# Patient Record
Sex: FEMALE | Race: WHITE | Employment: OTHER | URBAN - METROPOLITAN AREA
[De-identification: names, ages, dates, MRNs, and addresses within clinical notes are randomized per-mention and may not be internally consistent; named-entity substitution may affect disease eponyms.]

---

## 2017-02-24 ENCOUNTER — PATIENT MESSAGE (OUTPATIENT)
Dept: FAMILY MEDICINE CLINIC | Facility: CLINIC | Age: 67
End: 2017-02-24

## 2017-02-24 DIAGNOSIS — N63.0 BREAST NODULE: Primary | ICD-10-CM

## 2017-02-25 ENCOUNTER — APPOINTMENT (OUTPATIENT)
Dept: LAB | Facility: HOSPITAL | Age: 67
End: 2017-02-25
Attending: FAMILY MEDICINE
Payer: COMMERCIAL

## 2017-02-25 DIAGNOSIS — E89.0 POSTOPERATIVE HYPOTHYROIDISM: ICD-10-CM

## 2017-02-25 LAB
FREE T4: 1.3 NG/DL (ref 0.9–1.8)
TSI SER-ACNC: 0.31 MIU/ML (ref 0.35–5.5)

## 2017-02-25 PROCEDURE — 84443 ASSAY THYROID STIM HORMONE: CPT

## 2017-02-25 PROCEDURE — 84439 ASSAY OF FREE THYROXINE: CPT

## 2017-02-25 PROCEDURE — 36415 COLL VENOUS BLD VENIPUNCTURE: CPT

## 2017-02-27 DIAGNOSIS — E89.0 HYPOTHYROIDISM ASSOCIATED WITH SURGICAL PROCEDURE: Primary | ICD-10-CM

## 2017-02-27 RX ORDER — LEVOTHYROXINE SODIUM 88 UG/1
88 TABLET ORAL
Qty: 30 TABLET | Refills: 1 | Status: SHIPPED | OUTPATIENT
Start: 2017-02-27 | End: 2017-04-10

## 2017-02-27 NOTE — TELEPHONE ENCOUNTER
From: Rolf Reynolds  To: Jed Alfaro DO  Sent: 2/24/2017 8:37 PM CST  Subject: Other    Please submit order for 6 mo follow up for diag mamm due in May 2017

## 2017-04-07 ENCOUNTER — APPOINTMENT (OUTPATIENT)
Dept: LAB | Facility: HOSPITAL | Age: 67
End: 2017-04-07
Attending: FAMILY MEDICINE
Payer: COMMERCIAL

## 2017-04-07 DIAGNOSIS — E89.0 HYPOTHYROIDISM ASSOCIATED WITH SURGICAL PROCEDURE: ICD-10-CM

## 2017-04-07 PROCEDURE — 84439 ASSAY OF FREE THYROXINE: CPT

## 2017-04-07 PROCEDURE — 84443 ASSAY THYROID STIM HORMONE: CPT

## 2017-04-07 PROCEDURE — 36415 COLL VENOUS BLD VENIPUNCTURE: CPT

## 2017-05-24 ENCOUNTER — HOSPITAL ENCOUNTER (OUTPATIENT)
Dept: MAMMOGRAPHY | Facility: HOSPITAL | Age: 67
Discharge: HOME | End: 2017-05-24
Attending: FAMILY MEDICINE
Payer: COMMERCIAL

## 2017-05-24 DIAGNOSIS — N63.0 BREAST NODULE: ICD-10-CM

## 2017-05-24 PROCEDURE — 77065 DX MAMMO INCL CAD UNI: CPT | Performed by: FAMILY MEDICINE

## 2017-05-24 PROCEDURE — 77061 BREAST TOMOSYNTHESIS UNI: CPT | Performed by: FAMILY MEDICINE

## 2017-10-30 ENCOUNTER — PATIENT MESSAGE (OUTPATIENT)
Dept: FAMILY MEDICINE CLINIC | Facility: CLINIC | Age: 67
End: 2017-10-30

## 2017-10-30 ENCOUNTER — OFFICE VISIT (OUTPATIENT)
Dept: FAMILY MEDICINE CLINIC | Facility: CLINIC | Age: 67
End: 2017-10-30

## 2017-10-30 VITALS
WEIGHT: 216 LBS | HEART RATE: 76 BPM | RESPIRATION RATE: 18 BRPM | SYSTOLIC BLOOD PRESSURE: 126 MMHG | BODY MASS INDEX: 36.88 KG/M2 | HEIGHT: 64 IN | TEMPERATURE: 99 F | DIASTOLIC BLOOD PRESSURE: 80 MMHG

## 2017-10-30 DIAGNOSIS — Z00.00 ANNUAL PHYSICAL EXAM: Primary | ICD-10-CM

## 2017-10-30 DIAGNOSIS — Z12.11 COLON CANCER SCREENING: ICD-10-CM

## 2017-10-30 DIAGNOSIS — D22.9 ATYPICAL NEVI: ICD-10-CM

## 2017-10-30 DIAGNOSIS — Z12.39 SCREENING BREAST EXAMINATION: Primary | ICD-10-CM

## 2017-10-30 DIAGNOSIS — Z01.419 WELL WOMAN EXAM WITH ROUTINE GYNECOLOGICAL EXAM: ICD-10-CM

## 2017-10-30 PROCEDURE — 99397 PER PM REEVAL EST PAT 65+ YR: CPT | Performed by: FAMILY MEDICINE

## 2017-10-30 PROCEDURE — 90732 PPSV23 VACC 2 YRS+ SUBQ/IM: CPT | Performed by: FAMILY MEDICINE

## 2017-10-30 PROCEDURE — 90471 IMMUNIZATION ADMIN: CPT | Performed by: FAMILY MEDICINE

## 2017-10-30 NOTE — PROGRESS NOTES
HPI:   Brian Vazquez is a 78year old female who presents for a complete physical exam.     Wt Readings from Last 6 Encounters:  10/30/17 : 216 lb  10/03/16 : 205 lb  08/20/16 : 196 lb  01/05/16 : 200 lb    Body mass index is 37.08 kg/m².        Cassandra Shen Used                      Alcohol use: No              Occ: . : . Children:   Works at [employer] . Exercise: 5 times per week.   Diet: watches minimally     REVIEW OF SYSTEMS:   GENERAL: feels well otherwise  SKIN: denies any Maya gynecological exam      2. Annual physical exam    - FREE T4 (FREE THYROXINE); Future  - ASSAY, THYROID STIM HORMONE; Future  - FREE T3 (TRIIODOTHYRONINE); Future  - LIPID PANEL; Future  - CBC WITH DIFFERENTIAL WITH PLATELET;  Future  - VITAMIN B12; Future

## 2017-10-31 ENCOUNTER — LAB ENCOUNTER (OUTPATIENT)
Dept: LAB | Facility: HOSPITAL | Age: 67
End: 2017-10-31
Attending: FAMILY MEDICINE
Payer: COMMERCIAL

## 2017-10-31 DIAGNOSIS — Z00.00 ANNUAL PHYSICAL EXAM: ICD-10-CM

## 2017-10-31 PROCEDURE — 85025 COMPLETE CBC W/AUTO DIFF WBC: CPT

## 2017-10-31 PROCEDURE — 84443 ASSAY THYROID STIM HORMONE: CPT

## 2017-10-31 PROCEDURE — 82306 VITAMIN D 25 HYDROXY: CPT

## 2017-10-31 PROCEDURE — 84481 FREE ASSAY (FT-3): CPT

## 2017-10-31 PROCEDURE — 80053 COMPREHEN METABOLIC PANEL: CPT

## 2017-10-31 PROCEDURE — 36415 COLL VENOUS BLD VENIPUNCTURE: CPT

## 2017-10-31 PROCEDURE — 84439 ASSAY OF FREE THYROXINE: CPT

## 2017-10-31 PROCEDURE — 80061 LIPID PANEL: CPT

## 2017-10-31 PROCEDURE — 82607 VITAMIN B-12: CPT

## 2017-10-31 NOTE — TELEPHONE ENCOUNTER
5-25-17 Mammogram's Conclusion:  The patient is due for her bilateral study in October of 2017. Order entered. Task completed.

## 2017-10-31 NOTE — TELEPHONE ENCOUNTER
----- Message from 1900 GITA Hart sent at 10/30/2017  7:44 PM CDT -----  Regarding: Non-Urgent Medical Question  Contact: [phone]  Please have Dr. Queta Jung put in a Screening Mamm order - I am due for this in November 2017 - Thank you  Janna Mathis

## 2017-11-01 ENCOUNTER — PATIENT MESSAGE (OUTPATIENT)
Dept: FAMILY MEDICINE CLINIC | Facility: CLINIC | Age: 67
End: 2017-11-01

## 2017-11-02 RX ORDER — LEVOTHYROXINE SODIUM 88 UG/1
88 TABLET ORAL
Qty: 90 TABLET | Refills: 3 | Status: SHIPPED | OUTPATIENT
Start: 2017-11-02 | End: 2018-02-06

## 2017-11-02 NOTE — TELEPHONE ENCOUNTER
From: Rick Hart  To: Elham Valle DO  Sent: 11/1/2017 8:37 PM CDT  Subject: Non-Urgent Medical Question    Hi,    I had my 2d3d diag mamm 6 mo follow up in May 2017.  The radiologist and test results indicate that I am due for 2d3d screening mamm

## 2018-01-05 ENCOUNTER — OFFICE VISIT (OUTPATIENT)
Dept: FAMILY MEDICINE CLINIC | Facility: CLINIC | Age: 68
End: 2018-01-05

## 2018-01-05 VITALS
WEIGHT: 216 LBS | SYSTOLIC BLOOD PRESSURE: 128 MMHG | TEMPERATURE: 98 F | DIASTOLIC BLOOD PRESSURE: 80 MMHG | HEART RATE: 90 BPM | HEIGHT: 64 IN | BODY MASS INDEX: 36.88 KG/M2 | RESPIRATION RATE: 16 BRPM

## 2018-01-05 DIAGNOSIS — M54.50 LOW BACK PAIN WITHOUT SCIATICA, UNSPECIFIED BACK PAIN LATERALITY, UNSPECIFIED CHRONICITY: Primary | ICD-10-CM

## 2018-01-05 DIAGNOSIS — B34.9 VIRAL SYNDROME: ICD-10-CM

## 2018-01-05 LAB
APPEARANCE: CLEAR
MULTISTIX LOT#: NORMAL NUMERIC
PH, URINE: 6 (ref 4.5–8)
SPECIFIC GRAVITY: 1.01 (ref 1–1.03)
URINE-COLOR: YELLOW
UROBILINOGEN,SEMI-QN: 0.2 MG/DL (ref 0–1.9)

## 2018-01-05 PROCEDURE — 81003 URINALYSIS AUTO W/O SCOPE: CPT | Performed by: FAMILY MEDICINE

## 2018-01-05 PROCEDURE — 87086 URINE CULTURE/COLONY COUNT: CPT | Performed by: FAMILY MEDICINE

## 2018-01-05 PROCEDURE — 99213 OFFICE O/P EST LOW 20 MIN: CPT | Performed by: FAMILY MEDICINE

## 2018-01-05 RX ORDER — OSELTAMIVIR PHOSPHATE 75 MG/1
75 CAPSULE ORAL 2 TIMES DAILY
Qty: 10 CAPSULE | Refills: 0 | Status: SHIPPED | OUTPATIENT
Start: 2018-01-05 | End: 2018-01-10

## 2018-01-05 NOTE — PROGRESS NOTES
Pt here with cold symptoms.     Pt was sick 12/27- 12 hour stomach bug  Pt was constipated ; better after otc med    Started yesterday   Pt had severe back pain   + chills   Getting worse   + nauseated   No dysuria / no h/o UTI    OTC meds -   No  cough and

## 2018-01-06 ENCOUNTER — APPOINTMENT (OUTPATIENT)
Dept: GENERAL RADIOLOGY | Facility: HOSPITAL | Age: 68
DRG: 075 | End: 2018-01-06
Attending: EMERGENCY MEDICINE
Payer: COMMERCIAL

## 2018-01-06 ENCOUNTER — HOSPITAL ENCOUNTER (INPATIENT)
Facility: HOSPITAL | Age: 68
LOS: 3 days | Discharge: HOME HEALTH | DRG: 075 | End: 2018-01-10
Attending: EMERGENCY MEDICINE | Admitting: INTERNAL MEDICINE
Payer: COMMERCIAL

## 2018-01-06 DIAGNOSIS — R41.82 ALTERED MENTAL STATUS, UNSPECIFIED ALTERED MENTAL STATUS TYPE: Primary | ICD-10-CM

## 2018-01-06 DIAGNOSIS — R50.9 FEVER, UNSPECIFIED FEVER CAUSE: ICD-10-CM

## 2018-01-06 LAB
ALBUMIN SERPL-MCNC: 3.7 G/DL (ref 3.5–4.8)
ALP LIVER SERPL-CCNC: 66 U/L (ref 55–142)
ALT SERPL-CCNC: 27 U/L (ref 14–54)
APTT PPP: 28.6 SECONDS (ref 25–34)
AST SERPL-CCNC: 17 U/L (ref 15–41)
BASOPHILS # BLD AUTO: 0.03 X10(3) UL (ref 0–0.1)
BASOPHILS NFR BLD AUTO: 0.3 %
BILIRUB SERPL-MCNC: 1.7 MG/DL (ref 0.1–2)
BILIRUB UR QL STRIP.AUTO: NEGATIVE
BUN BLD-MCNC: 14 MG/DL (ref 8–20)
CALCIUM BLD-MCNC: 9.2 MG/DL (ref 8.3–10.3)
CHLORIDE: 105 MMOL/L (ref 101–111)
CLARITY UR REFRACT.AUTO: CLEAR
CO2: 23 MMOL/L (ref 22–32)
COLOR UR AUTO: YELLOW
CREAT BLD-MCNC: 1.01 MG/DL (ref 0.55–1.02)
EOSINOPHIL # BLD AUTO: 0.03 X10(3) UL (ref 0–0.3)
EOSINOPHIL NFR BLD AUTO: 0.3 %
ERYTHROCYTE [DISTWIDTH] IN BLOOD BY AUTOMATED COUNT: 13.2 % (ref 11.5–16)
GLUCOSE BLD-MCNC: 105 MG/DL (ref 70–99)
GLUCOSE BLD-MCNC: 106 MG/DL (ref 65–99)
GLUCOSE CSF: 64 MG/DL (ref 40–70)
GLUCOSE UR STRIP.AUTO-MCNC: NEGATIVE MG/DL
HCT VFR BLD AUTO: 47.2 % (ref 34–50)
HGB BLD-MCNC: 15.5 G/DL (ref 12–16)
IMMATURE GRANULOCYTE COUNT: 0.03 X10(3) UL (ref 0–1)
IMMATURE GRANULOCYTE RATIO %: 0.3 %
INR BLD: 1.04 (ref 0.89–1.11)
KETONES UR STRIP.AUTO-MCNC: NEGATIVE MG/DL
LACTIC ACID: 1.2 MMOL/L (ref 0.5–2)
LEUKOCYTE ESTERASE UR QL STRIP.AUTO: NEGATIVE
LYMPHOCYTES # BLD AUTO: 1.07 X10(3) UL (ref 0.9–4)
LYMPHOCYTES NFR BLD AUTO: 11.5 %
M PROTEIN MFR SERPL ELPH: 7.4 G/DL (ref 6.1–8.3)
MCH RBC QN AUTO: 28.1 PG (ref 27–33.2)
MCHC RBC AUTO-ENTMCNC: 32.8 G/DL (ref 31–37)
MCV RBC AUTO: 85.7 FL (ref 81–100)
MONOCYTES # BLD AUTO: 0.58 X10(3) UL (ref 0.1–0.6)
MONOCYTES NFR BLD AUTO: 6.2 %
NEUTROPHIL ABS PRELIM: 7.6 X10 (3) UL (ref 1.3–6.7)
NEUTROPHILS # BLD AUTO: 7.6 X10(3) UL (ref 1.3–6.7)
NEUTROPHILS NFR BLD AUTO: 81.4 %
NITRITE UR QL STRIP.AUTO: NEGATIVE
PH UR STRIP.AUTO: 6 [PH] (ref 4.5–8)
PLATELET # BLD AUTO: 230 10(3)UL (ref 150–450)
POTASSIUM SERPL-SCNC: 3.5 MMOL/L (ref 3.6–5.1)
PROT UR STRIP.AUTO-MCNC: NEGATIVE MG/DL
PSA SERPL DL<=0.01 NG/ML-MCNC: 13.6 SECONDS (ref 12–14.3)
RBC # BLD AUTO: 5.51 X10(6)UL (ref 3.8–5.1)
RBC UR QL AUTO: NEGATIVE
RED CELL DISTRIBUTION WIDTH-SD: 41.3 FL (ref 35.1–46.3)
SODIUM SERPL-SCNC: 141 MMOL/L (ref 136–144)
SP GR UR STRIP.AUTO: 1.02 (ref 1–1.03)
TOTAL PROTEIN CSF: 101 MG/DL (ref 15–45)
UROBILINOGEN UR STRIP.AUTO-MCNC: <2 MG/DL
WBC # BLD AUTO: 9.3 X10(3) UL (ref 4–13)

## 2018-01-06 PROCEDURE — 71045 X-RAY EXAM CHEST 1 VIEW: CPT | Performed by: EMERGENCY MEDICINE

## 2018-01-06 PROCEDURE — 009U3ZX DRAINAGE OF SPINAL CANAL, PERCUTANEOUS APPROACH, DIAGNOSTIC: ICD-10-PCS | Performed by: RADIOLOGY

## 2018-01-06 PROCEDURE — 99223 1ST HOSP IP/OBS HIGH 75: CPT | Performed by: INTERNAL MEDICINE

## 2018-01-06 PROCEDURE — 62270 DX LMBR SPI PNXR: CPT | Performed by: EMERGENCY MEDICINE

## 2018-01-06 PROCEDURE — 00JU3ZZ INSPECTION OF SPINAL CANAL, PERCUTANEOUS APPROACH: ICD-10-PCS | Performed by: EMERGENCY MEDICINE

## 2018-01-06 PROCEDURE — 77003 FLUOROGUIDE FOR SPINE INJECT: CPT | Performed by: EMERGENCY MEDICINE

## 2018-01-06 RX ORDER — LORAZEPAM 2 MG/ML
1 INJECTION INTRAMUSCULAR ONCE
Status: COMPLETED | OUTPATIENT
Start: 2018-01-06 | End: 2018-01-06

## 2018-01-06 RX ORDER — ACETAMINOPHEN 650 MG/1
1300 SUPPOSITORY RECTAL ONCE
Status: COMPLETED | OUTPATIENT
Start: 2018-01-06 | End: 2018-01-06

## 2018-01-06 RX ORDER — SODIUM CHLORIDE 9 MG/ML
INJECTION, SOLUTION INTRAVENOUS CONTINUOUS
Status: DISCONTINUED | OUTPATIENT
Start: 2018-01-06 | End: 2018-01-10

## 2018-01-06 RX ORDER — LORAZEPAM 2 MG/ML
INJECTION INTRAMUSCULAR
Status: COMPLETED
Start: 2018-01-06 | End: 2018-01-06

## 2018-01-06 RX ORDER — LORAZEPAM 2 MG/ML
INJECTION INTRAMUSCULAR
Status: DISPENSED
Start: 2018-01-06 | End: 2018-01-07

## 2018-01-06 NOTE — ED NOTES
Limited initial assessment. Pt unable to answer questions. Attempt to take oral temp. Told pt to close her mouth, pt leaves mouth open. Pt not understanding commands and questions.

## 2018-01-06 NOTE — ED NOTES
Pt has a difficult time following commands. Able to state first name only at this time. Pt grabbing for IVs and wires. Emotional support provided.  Lights dimmed in room

## 2018-01-06 NOTE — CONSULTS
INFECTIOUS DISEASE CONSULT NOTE    Eliherbthann Males Patient Status:  Emergency    1950 MRN [id number]   Location [address] Attending Gonzalo Colunga, [address] [facility] Day # 0 tobacco. She reports that she does not drink alcohol or use drugs. Allergies:    Morphine                Other (See Comments)    Comment:Patient fainted.  Er told her to not take any             morphine    Ant-Infective Medications          Oseltamivir Encephalopathy -  toxometabolic vs infectious - viral meningits a concern?  Aawiting LP at this tie    PLAN:  -Continue iv cefriaxone/ iv vancomycin/ iv acyclovir for now pending LP results  -Please send CSF for gluc,prot, Gram stain, bacterial cultures, HS

## 2018-01-06 NOTE — ED INITIAL ASSESSMENT (HPI)
Pt to ED via EMS from [facility] for AMS. Pt last known normal was 2100 last night. Pt woke up this AM with confusion and was mumbling her words. Pt unable to follow commands and unable to state name. [facility] on arrival was 11.  Per EMS, CT was negativ

## 2018-01-07 ENCOUNTER — APPOINTMENT (OUTPATIENT)
Dept: MRI IMAGING | Facility: HOSPITAL | Age: 68
DRG: 075 | End: 2018-01-07
Attending: Other
Payer: COMMERCIAL

## 2018-01-07 PROBLEM — G04.90 ENCEPHALITIS: Status: ACTIVE | Noted: 2018-01-07

## 2018-01-07 PROBLEM — G04.90 ENCEPHALITIS (HCC): Status: ACTIVE | Noted: 2018-01-07

## 2018-01-07 PROBLEM — R50.9 FEVER, UNSPECIFIED FEVER CAUSE: Status: ACTIVE | Noted: 2018-01-07

## 2018-01-07 PROBLEM — R50.9 FEVER: Status: ACTIVE | Noted: 2018-01-07

## 2018-01-07 PROBLEM — R41.82 ALTERED MENTAL STATUS, UNSPECIFIED ALTERED MENTAL STATUS TYPE: Status: ACTIVE | Noted: 2018-01-07

## 2018-01-07 PROBLEM — E87.6 HYPOKALEMIA: Status: ACTIVE | Noted: 2018-01-07

## 2018-01-07 LAB
ADENOVIRUS PCR:: NEGATIVE
ALBUMIN SERPL-MCNC: 3 G/DL (ref 3.5–4.8)
ALP LIVER SERPL-CCNC: 64 U/L (ref 55–142)
ALT SERPL-CCNC: 23 U/L (ref 14–54)
AST SERPL-CCNC: 17 U/L (ref 15–41)
ATRIAL RATE: 109 BPM
B PERT DNA SPEC QL NAA+PROBE: NEGATIVE
BASOPHIL CSF: 0 %
BASOPHILS # BLD AUTO: 0.04 X10(3) UL (ref 0–0.1)
BASOPHILS NFR BLD AUTO: 0.5 %
BILIRUB SERPL-MCNC: 1.1 MG/DL (ref 0.1–2)
BUN BLD-MCNC: 13 MG/DL (ref 8–20)
C PNEUM DNA SPEC QL NAA+PROBE: NEGATIVE
CALCIUM BLD-MCNC: 8.1 MG/DL (ref 8.3–10.3)
CHLORIDE: 107 MMOL/L (ref 101–111)
CLARITY CSF: CLEAR
CLARITY CSF: CLEAR
CO2: 25 MMOL/L (ref 22–32)
COLOR CSF: COLORLESS
COLOR CSF: COLORLESS
CORONAVIRUS 229E PCR:: NEGATIVE
CORONAVIRUS HKU1 PCR:: NEGATIVE
CORONAVIRUS NL63 PCR:: NEGATIVE
CORONAVIRUS OC43 PCR:: NEGATIVE
COUNT PERFORMED ON TUBE: 1
COUNT PERFORMED ON TUBE: 4
CREAT BLD-MCNC: 0.87 MG/DL (ref 0.55–1.02)
EOSINOPHIL # BLD AUTO: 0.14 X10(3) UL (ref 0–0.3)
EOSINOPHIL NFR BLD AUTO: 1.8 %
EOSINOPHILS CSF: 0 %
ERYTHROCYTE [DISTWIDTH] IN BLOOD BY AUTOMATED COUNT: 13.4 % (ref 11.5–16)
FLUAV RNA SPEC QL NAA+PROBE: NEGATIVE
FLUBV RNA SPEC QL NAA+PROBE: NEGATIVE
FREE T4: 1.1 NG/DL (ref 0.9–1.8)
GLUCOSE BLD-MCNC: 103 MG/DL (ref 70–99)
HAV IGM SER QL: 2.3 MG/DL (ref 1.7–3)
HCT VFR BLD AUTO: 42.4 % (ref 34–50)
HGB BLD-MCNC: 13.7 G/DL (ref 12–16)
IMMATURE GRANULOCYTE COUNT: 0.01 X10(3) UL (ref 0–1)
IMMATURE GRANULOCYTE RATIO %: 0.1 %
LACTIC ACID: 1.1 MMOL/L (ref 0.5–2)
LACTIC ACID: 1.4 MMOL/L (ref 0.5–2)
LYMPHOCYTES # BLD AUTO: 0.89 X10(3) UL (ref 0.9–4)
LYMPHOCYTES CSF: 63 %
LYMPHOCYTES NFR BLD AUTO: 11.3 %
M PROTEIN MFR SERPL ELPH: 6.5 G/DL (ref 6.1–8.3)
MCH RBC QN AUTO: 27.7 PG (ref 27–33.2)
MCHC RBC AUTO-ENTMCNC: 32.3 G/DL (ref 31–37)
MCV RBC AUTO: 85.8 FL (ref 81–100)
METAPNEUMOVIRUS PCR:: NEGATIVE
MONOCYTES # BLD AUTO: 0.61 X10(3) UL (ref 0.1–0.6)
MONOCYTES NFR BLD AUTO: 7.7 %
MONOMACROPHAGES CSF: 37 %
MYCOPLASMA PNEUMONIA PCR:: NEGATIVE
NEUTROPHIL ABS PRELIM: 6.21 X10 (3) UL (ref 1.3–6.7)
NEUTROPHILS # BLD AUTO: 6.21 X10(3) UL (ref 1.3–6.7)
NEUTROPHILS CSF: 0 %
NEUTROPHILS NFR BLD AUTO: 78.6 %
P AXIS: 66 DEGREES
P-R INTERVAL: 164 MS
PARAINFLUENZA 1 PCR:: NEGATIVE
PARAINFLUENZA 2 PCR:: NEGATIVE
PARAINFLUENZA 3 PCR:: NEGATIVE
PARAINFLUENZA 4 PCR:: NEGATIVE
PLATELET # BLD AUTO: 217 10(3)UL (ref 150–450)
POTASSIUM SERPL-SCNC: 3.5 MMOL/L (ref 3.6–5.1)
Q-T INTERVAL: 338 MS
QRS DURATION: 94 MS
QTC CALCULATION (BEZET): 455 MS
R AXIS: 13 DEGREES
RBC # BLD AUTO: 4.94 X10(6)UL (ref 3.8–5.1)
RBC CSF TUBE 1: 350 /MM3
RBC CSF: 13 /MM3
RED CELL DISTRIBUTION WIDTH-SD: 42.1 FL (ref 35.1–46.3)
RHINOVIRUS/ENTERO PCR:: NEGATIVE
RSV RNA SPEC QL NAA+PROBE: NEGATIVE
SODIUM SERPL-SCNC: 142 MMOL/L (ref 136–144)
T AXIS: 61 DEGREES
TOTAL CELLS COUNTED: 100
TOTAL VOLUME CSF: 13 ML
TSI SER-ACNC: 0.76 MIU/ML (ref 0.35–5.5)
TSI SER-ACNC: 0.76 MIU/ML (ref 0.35–5.5)
VENTRICULAR RATE: 109 BPM
WBC # BLD AUTO: 7.9 X10(3) UL (ref 4–13)
WBC # FLD MANUAL: 59 /MM3 (ref 0–5)

## 2018-01-07 PROCEDURE — 99232 SBSQ HOSP IP/OBS MODERATE 35: CPT | Performed by: HOSPITALIST

## 2018-01-07 PROCEDURE — 70551 MRI BRAIN STEM W/O DYE: CPT | Performed by: OTHER

## 2018-01-07 PROCEDURE — 95816 EEG AWAKE AND DROWSY: CPT | Performed by: OTHER

## 2018-01-07 RX ORDER — SODIUM CHLORIDE 9 MG/ML
INJECTION, SOLUTION INTRAVENOUS CONTINUOUS
Status: ACTIVE | OUTPATIENT
Start: 2018-01-07 | End: 2018-01-07

## 2018-01-07 RX ORDER — LEVOTHYROXINE SODIUM 88 UG/1
176 TABLET ORAL
Status: DISCONTINUED | OUTPATIENT
Start: 2018-01-07 | End: 2018-01-07

## 2018-01-07 RX ORDER — ACETAMINOPHEN 325 MG/1
650 TABLET ORAL EVERY 6 HOURS PRN
Status: DISCONTINUED | OUTPATIENT
Start: 2018-01-07 | End: 2018-01-10

## 2018-01-07 RX ORDER — OSELTAMIVIR PHOSPHATE 75 MG/1
75 CAPSULE ORAL 2 TIMES DAILY
Status: DISCONTINUED | OUTPATIENT
Start: 2018-01-07 | End: 2018-01-08

## 2018-01-07 RX ORDER — SODIUM CHLORIDE 9 MG/ML
INJECTION, SOLUTION INTRAVENOUS CONTINUOUS
Status: DISCONTINUED | OUTPATIENT
Start: 2018-01-07 | End: 2018-01-10

## 2018-01-07 RX ORDER — ONDANSETRON 2 MG/ML
4 INJECTION INTRAMUSCULAR; INTRAVENOUS EVERY 6 HOURS PRN
Status: DISCONTINUED | OUTPATIENT
Start: 2018-01-07 | End: 2018-01-10

## 2018-01-07 RX ORDER — LEVOTHYROXINE SODIUM 88 UG/1
88 TABLET ORAL
Status: DISCONTINUED | OUTPATIENT
Start: 2018-01-07 | End: 2018-01-07

## 2018-01-07 NOTE — ED NOTES
Round on pt. Pt remains restless in bed. Lights off in room. Emotional support provided. Attempt to redirect pt.

## 2018-01-07 NOTE — PROGRESS NOTES
[facility] dosing service    Initial Pharmacokinetic Consult for Vancomycin Dosing     Siva Kaminski is a 78year old female admitted on 1/7/18 who is being treated for Meningitis. Pharmacy has been asked to dose Vancomycin by Dr. Nalini Patel. above: 1. This patient will receive a loading dose of Vancomycin  2 gm IVPB (25mg/kg, capped at 2g) x 1 dose, followed by 1 gm Q 12 hours  based upon,  weight, renal function, and pharmacokinetics.     2.  Pharmacy ordered Vancomycin trough level prior t

## 2018-01-07 NOTE — ED NOTES
LP in xr was unsuccessful at 2014 = to return to XR with Dr Sima Randle at 2140. New update is 2200. Per ermd pt to stay in er until LP results are available. Pt remains resting comfortably with family at bs. HYLTON without difficulty.  Denies pain/visual changes/nause

## 2018-01-07 NOTE — ED NOTES
Left for IR at 2210 - procedure completed at 2225. Pt tolerated well. Awaiting results prior to transfer to inpt room assignment. No changes in pt's assessment.

## 2018-01-07 NOTE — ED NOTES
Round on pt. Pt remains restless in bed. Spoke in Skyla Crofts in XR.  Sts will be sending for pt in 10 mins

## 2018-01-07 NOTE — PROCEDURES
[facility]  Procedure Note    Arby Comas Patient Status:  Emergency    1950 MRN [id number]   Location [address] [facility] Attending Too Busch, [facility] [facility] Day # 0 LORI Knox DO     Procedure: Fluoro LP    Pre-Pro

## 2018-01-07 NOTE — H&P
[facility]                                                               History & Physical         Gay Quijano Patient Status:  Emergency    1950 MRN [id number]   Location [address] [facility] Attending BELLE Garcia physician and also had an LP done which showed CSF WBC of 59 with a CSF protein of 101 and CSF glucose of 64. Patient being admitted for  encephalitis with neurology on consult.   Patient was started on IV ceftriaxone and IV vancomycin and IV acyclovir by extremities, no focal neurological deficits. Musculoskeletal: Full range of motion of all extremities. No swelling noted. Integument: No lesions. No erythema. Psychiatric: Appropriate mood and affect.       Diagnostic Data:      Laboratory Data:     Lab viral studies. 4. Follow results of expanded respiratory flu panel  2. Hypokalemia–replace        Quality:  · DVT Prophylaxis: SCDs.   No pharmacological DVT prophylaxis started today due to patient had LP today  · CODE status: full  · Ceron: no    Plan

## 2018-01-07 NOTE — PROGRESS NOTES
INFECTIOUS DISEASE PROGRESS NOTE    Blanquita Staley Patient Status:  Inpatient    1950 MRN [id number]   [facility] 4NW-A At Comments)    Comment:Patient fainted.  Er told her to not take any             morphine      Physical Exam:    Temp:  [98.5 °F (36.9 °C)-100.3 °F (37.9 °C)] 98.5 °F (36.9 °C)  Pulse:  [] 87  Resp:  [16-32] 22  BP: (116-165)/(42-98) 152/78  Patient Vit  01/06/2018   K 3.5 01/06/2018    01/06/2018   CO2 23.0 01/06/2018    01/06/2018   CA 9.2 01/06/2018   ALB 3.7 01/06/2018   ALKPHO 66 01/06/2018   BILT 1.7 01/06/2018   TP 7.4 01/06/2018   AST 17 01/06/2018   ALT 27 01/06/2018   PTT 28

## 2018-01-07 NOTE — PLAN OF CARE
NURSING ADMISSION NOTE      Patient admitted via Cart  Oriented to room. Safety precautions initiated. Bed in low position. Call light in reach. Patient alert and confused. Able to state name.  Does not follow command well and has expressive aphas

## 2018-01-07 NOTE — PROGRESS NOTES
[facility]  Progress Note     Stephen Garcia Patient Status:  Inpatient    1950 MRN [id number]   [facility] 4NW-A Attending Morteza Chu MD   Hosp Day # 0 PCP Merary Lovelace DO     Chief Complaint: AMS    S: Patient stil Sodium  88 mcg Oral Before breakfast   • Oseltamivir Phosphate  75 mg Oral BID   • potassium chloride 40mEq IVPB (peripheral line)  40 mEq Intravenous Once       ASSESSMENT / PLAN:     1.  Altered mental status, acute encephalitis - LP with elevated WBCs an

## 2018-01-07 NOTE — CONSULTS
Pharmacy Note:  Automatic IV Levothyroxine Hold    Levothyroxine 44 mcg IV every 24 hours was ordered by Dr. Zainab Foster.     Lab Results  Component Value Date   TSH 0.764 01/07/2018   TSH 0.764 01/07/2018   T4F 1.1 01/07/2018       The patient meets the crit

## 2018-01-07 NOTE — PLAN OF CARE
Impaired Swallowing    • Minimize aspiration risk Not Progressing        Neurological    • Ability to function at adequate level Not Progressing        UNABLE TO FOLLOW INSTRUCTIONS , CONFUSION NOTED , DYSPHAGIA NOTED , WEAKNESS , AFEBRILE , FOODS  AND PO

## 2018-01-07 NOTE — PROCEDURES
[id number] [facility] with [facility]  2018    4:22 PM        Deidre Grade  1950    Date of testin2018    Ordering provider:   Dr Elsa Salazar    Reason for testing:  Fever and confusion  Tech

## 2018-01-07 NOTE — ED PROVIDER NOTES
Patient Seen in: [facility] Emergency Department    History   Patient presents with:  Altered Mental Status (neurologic)    Stated Complaint: CVA    HPI    60-year-old female presents emergency room with altered mental status.    states that y tobacco: Never Used                      Alcohol use: No                Review of Systems    Positive for stated complaint: CVA  Other systems are as noted in HPI. Constitutional and vital signs reviewed.       All other systems reviewed and negative excep within normal limits   CELL COUNT, CSF - Abnormal; Notable for the following:     WBC CSF 59 (*)     All other components within normal limits   PROTEIN, TOTAL, CSF - Abnormal; Notable for the following:      Total Protein .0 (*)     All other compon the individual orders. URINALYSIS WITH CULTURE REFLEX   CSF RBC TUBE 1   CELLCOUNT/DIFF CSF   PATH COMMENT CSF   CBC WITH DIFFERENTIAL WITH PLATELET    Narrative: The following orders were created for panel order CBC WITH DIFFERENTIAL WITH PLATELET. Clinical Impression:  Altered mental status, unspecified altered mental status type  (primary encounter diagnosis)  Fever, unspecified fever cause    Disposition:  Admit  1/6/2018  8:37 pm    Follow-up:  No follow-up provider specified.       Medication

## 2018-01-07 NOTE — ED NOTES
Bedside report completed - pt stated her name and birthdate. Conversing with spouse at bs and answering simple questions. Pt denies pain/visual changes;states she only wears reading glasses and remains HYLTON.

## 2018-01-07 NOTE — SLP NOTE
ADULT SWALLOWING EVALUATION    ASSESSMENT    ASSESSMENT/OVERALL IMPRESSION:  Bedside swallow evaluation. Per RN, pt unable to swallow this date.  Per chart, \"Patient went to bed last night in her normal state of health, one  went to wake her up from around the cup. Pt attempted a trial of thin liquid via straw and was able to seal her lips around the straw with maximal cues, however when instructed to drink, pt blew into the straw.  Pt attempted a trial of puree via 1/2 tsp and accepted the bolus with Phase of Swallow: Impaired  Bolus Retrieval: Impaired  Bilabial Seal: Impaired  Bolus Formation: Impaired  Bolus Propulsion: Impaired  Mastication: Impaired  Retention: Impaired    Pharyngeal Phase of Swallow: Impaired  Laryngeal Elevation Timing: Appears

## 2018-01-07 NOTE — ED NOTES
ED HOSPITALIST DR. Everardo Gurrola AT  WITH UPDATE ON INPT POC WITH SPOUSE AND PT. AWAITING LP RESULTS.

## 2018-01-07 NOTE — CONSULTS
[id number] [place] [address] with [facility]  1/7/2018    4:19 PM    Cc:  Confusional state and fever    HPI:  Symptoms started with prodromal nausea and flu like symptoms following a travel to [place] last week.    The d Disorder of thyroid    • Migraines    • Problems with swallowing      Past Surgical History:  No date: BENIGN BIOPSY RIGHT  No date: CHOLECYSTECTOMY  No date: KNEE SURGERY  11/2016: LEONEL BIOPSY STEREOTACTIC NODULE 1 SITE RIGHT      Comment: stromal fibrosis

## 2018-01-07 NOTE — ED NOTES
Pt left for IR 1910 with RN and monitor. Positioned on table x 3 assist - awaiting Radiologist. No changes in pt's assessment. VSS.

## 2018-01-07 NOTE — ED NOTES
Back from xr - pt and family updated. Pt increasing in restlessness - ermd updated and new orders received.

## 2018-01-08 LAB
CREAT BLD-MCNC: 0.8 MG/DL (ref 0.55–1.02)
POTASSIUM SERPL-SCNC: 3.4 MMOL/L (ref 3.6–5.1)
VANCOMYCIN TROUGH: 29.7 UG/ML (ref 10–20)

## 2018-01-08 PROCEDURE — 05H633Z INSERTION OF INFUSION DEVICE INTO LEFT SUBCLAVIAN VEIN, PERCUTANEOUS APPROACH: ICD-10-PCS | Performed by: HOSPITALIST

## 2018-01-08 PROCEDURE — 99233 SBSQ HOSP IP/OBS HIGH 50: CPT | Performed by: OTHER

## 2018-01-08 PROCEDURE — 99232 SBSQ HOSP IP/OBS MODERATE 35: CPT | Performed by: HOSPITALIST

## 2018-01-08 PROCEDURE — B547ZZA ULTRASONOGRAPHY OF LEFT SUBCLAVIAN VEIN, GUIDANCE: ICD-10-PCS | Performed by: HOSPITALIST

## 2018-01-08 RX ORDER — SODIUM CHLORIDE 0.9 % (FLUSH) 0.9 %
10 SYRINGE (ML) INJECTION EVERY 12 HOURS
Status: DISCONTINUED | OUTPATIENT
Start: 2018-01-08 | End: 2018-01-10

## 2018-01-08 NOTE — PROGRESS NOTES
[id number] [facility] Progress Note    [facility]:  [facility]    1950 MRN [id number]   [facility] 4NW-A Attending Gina Escamilla MD   [facility] Day # 1 PCP Dante Viramontes DO     Subjective:  Geneva Friedman side able to do with cues but has difficulty following commands to do on L side   Romberg:deferred  Gait: deferred    Labs:  Reviewed in EPIC;     Lab Results  Component Value Date   CREATSERUM 0.80 01/08/2018   K 3.4 01/08/2018     LP results:     CSF  Co 1/6/2018  CONCLUSION:  No focal consolidation is seen. If clinical symptoms persist consider followup radiographs or CT. Dictated by: Shahbaz Mcfarlane MD on 1/06/2018 at 16:53     Approved by: Shahbaz Mcfarlane MD              EEG (1/7/18):      The resting record i

## 2018-01-08 NOTE — PHYSICAL THERAPY NOTE
PHYSICAL THERAPY EVALUATION - INPATIENT     Room Number: 415/415-A  Evaluation Date: 1/8/2018  Type of Evaluation: Initial  Physician Order: PT Eval and Treat    Presenting Problem: AMS  Reason for Therapy: Mobility Dysfunction and Discharge Planning decreased awareness of need for safety  · Problem Solving:  assistance required to identify errors made, assistance required to generate solutions and assistance required to implement solutions  · decreased vocalization throughout session    1900 Palomo Castellanos entering room. Pt agreeable to PT and verbalizing with delayed responses and decreased vocalization. Pt demos supine to sit with mod A of 2. Sit to/from stand performed with mod A of 2 and HHA.   Attempted forwards gait, pt with heavy UE assist.  Pt gait conservation;Patient education; Family education;Gait training;Neuromuscular re-educate;Range of motion;Strengthening;Stair training;Balance training;Transfer training  Rehab Potential : Good  Frequency (Obs): 5x/week  Number of Visits to Meet Established G

## 2018-01-08 NOTE — OCCUPATIONAL THERAPY NOTE
OCCUPATIONAL THERAPY EVALUATION - INPATIENT     Room Number: 415/415-A  Evaluation Date: 1/8/2018  Type of Evaluation: Initial  Presenting Problem: suspected viral meningitis    Physician Order: IP Consult to Occupational Therapy  Reason for Therapy: ADL/I Level:  oriented to place, oriented to person, disoriented to time and disoriented to situation  Memory:  decreased long term memory and decreased short term memory  Following Commands:  follows one-step commands inconsistently  Initiation: hand over hand Sit to stand mod (A) of 2 via HHA, second trial sit to stand mod (A) of 2 via RW. Ambulates with chair follow in edmond. Assist of 2 needed, one person for balance and one person to steer RW. Informal cognitive screening completed. Total (A) eating task.   Pa Specific performance deficits impacting engagement in ADL/IADL HIGH  5+ performance deficits    Client Assessment/Performance Deficits HIGH - Comorbidities and significant modifications of tasks    Clinical Decision Making HIGH - Analysis of occupational

## 2018-01-08 NOTE — PROGRESS NOTES
Patient is alert but knows only her first name. Pt having difficulty with understanding and speaking.  at bedside. Pt has ivf and has no c/o pain.

## 2018-01-08 NOTE — PROGRESS NOTES
[facility]                INFECTIOUS DISEASE PROGRESS NOTE    Karen Wyatt Patient Status:  Inpatient    1950 MRN [id number]   [facility] 4NW-A Attending Mauri Alicea MD   [facility] Day # 1 PCP Venancio Viramontes DO     Antib Status: Completed Lab Status: Preliminary result Updated: 01/07/18 1700   Specimen: Blood from Blood,peripheral     Blood Culture Result No Growth 1 Day     Cell Count/Diff CSF [[id number]] Collected: 01/06/18 2222   Specimen: Cerebral spinal fluid Updated:

## 2018-01-08 NOTE — PROGRESS NOTES
[facility]  Progress Note     Elana Ruff Patient Status:  Inpatient    1950 MRN [id number]   [facility] 4NW-A Attending Jonny Pro MD   Hosp Day # 1 PCP Maya Ma DO     Chief Complaint: AMS    S: Mental statu 550 mg Intravenous Q8H   • Oseltamivir Phosphate  75 mg Oral BID   • [START ON 1/14/2018] levothyroxine sodium  44 mcg Intravenous Daily       ASSESSMENT / PLAN:     1.  Encephalopathy probable viral meningitis - LP with elevated WBCs and protein, normal gl

## 2018-01-08 NOTE — PROGRESS NOTES
[id number] [facility] Progress Note    Charmaine Oconnor Patient Status:  Inpatient    1950 MRN [id number]   [facility] 4NW-A Attending Gina Escamilla MD   [facility] Day # 1 PCP Dante Viramontes DO     CC: Confusion     Subjective: CSF Latest Units: /mm3  13   RBC CSF (Tube 1) Latest Units: /mm3 350    NEUTROPHILS CSF Latest Units: %  0   LYMPHOCYTES CSF Latest Units: %  63   MACROPHAGES CSF Latest Units: %  37   EOSINOPHILS CSF Latest Units: %  0   BASOPHIL CSF Latest Units: %  0

## 2018-01-08 NOTE — SLP NOTE
ADULT SWALLOWING EVALUATION    ASSESSMENT    ASSESSMENT/OVERALL IMPRESSION:  Repeat b/s swallow eval completed to assess po candidacy.   Pt seen for swallow eval 1/7/18 however pt demonstrated difficulty following simple directions and was unable to consist post trials. No c/o globus sensation. Obrien Assessment of Swallow Function Score: Mild    RECOMMENDATIONS   Diet Recommendations - Solids: Soft diet  Diet Recommendations - Liquid:  Thin    Compensatory Strategies Recommended: Slow rate;Small bites and s sips  Patient Positioning: Upright;Midline    Oral Phase of Swallow: Impaired  Bolus Retrieval: Intact  Bilabial Seal: Intact  Bolus Formation: Intact  Bolus Propulsion: Intact  Mastication: Impaired  Retention: Intact    Pharyngeal Phase of Swallow:  Withi

## 2018-01-08 NOTE — PROGRESS NOTES
[address] [facility] with [facility]  1/7/2018    6:54 PM      ADDENDUM to earlier notes:  MRI although incomplete does not show any DWI or FLAIR abnormality in brain parenchyma to indicate presence of encephali

## 2018-01-08 NOTE — PAYOR COMM NOTE
--------------  ADMISSION REVIEW     Payor: Luis ABEL PPO.[employer]  Subscriber #:  [id number]  Authorization Number: N/A    Admit date: 1/7/18  Admit time: 0038       Admitting Physician: Funmi Price MD  Attending Physician:  Alec Malloy fever.  Per  there is been no recent travel, patient has not been vomiting or having diarrhea. Patient has had cough with URI symptoms. Patient unable to provide history due to confusion. There is been no history of any rashes. No trauma.   No hi METABOLIC PANEL (14) - Abnormal; Notable for the following:        Result Value    Glucose 105 (*)     GFR 58 (*)     Potassium 3.5 (*)     All other components within normal limits   CELL COUNT, CSF - Abnormal; Notable for the following:     WBC CSF 59 (* 1816  ------------------------------------------------------------[GM. 2]       MDM   Patient IV established, placed on cardiac monitor and pulse ox. Patient was noted to have fever with altered mental status. Blood cultures performed.   Patient does not h on consult. 3.  Follow CSF culture and CSF viral studies. 4. Follow results of expanded respiratory flu panel  2. Hypokalemia–replace[MJ.2]        Quality:  · DVT Prophylaxis:[MJ.1] SCDs.   No pharmacological DVT prophylaxis started today due to patien corpus callosum, optic chiasm cerebellar tonsils are unremarkable.      =====  CONCLUSION:  Incomplete MRI examination. There is no evidence of an acute infarct.      PER NEUROLOGY   MRI although incomplete does not show any DWI or FLAIR abnormality in bra

## 2018-01-08 NOTE — CONSULTS
120 [facility] dosing service    Follow-up Pharmacokinetic Consult for Vancomycin Dosing     Fabio Barnes is a 78year old female admitted on 1/16/18 who is being treated for Meningitis. .  Patient is on day 3 of Vancomycin 1 gm IV Q 12 hours.   Goal tr Smear No WBCs seen N/A   2. BLOOD CULTURE Status: None (Preliminary result)   Collection Time: 01/06/18 4:27 PM   Result Value Ref Range   Blood Culture Result No Growth 1 Day N/A     Radiology: 1/6- No focal consolidation     Based on the above:    1.  Hol

## 2018-01-09 LAB
BASOPHILS # BLD AUTO: 0.03 X10(3) UL (ref 0–0.1)
BASOPHILS NFR BLD AUTO: 0.3 %
BUN BLD-MCNC: 11 MG/DL (ref 8–20)
CALCIUM BLD-MCNC: 8.4 MG/DL (ref 8.3–10.3)
CHLORIDE: 104 MMOL/L (ref 101–111)
CO2: 28 MMOL/L (ref 22–32)
CREAT BLD-MCNC: 0.75 MG/DL (ref 0.55–1.02)
EOSINOPHIL # BLD AUTO: 0.16 X10(3) UL (ref 0–0.3)
EOSINOPHIL NFR BLD AUTO: 1.8 %
ERYTHROCYTE [DISTWIDTH] IN BLOOD BY AUTOMATED COUNT: 13.2 % (ref 11.5–16)
GLUCOSE BLD-MCNC: 89 MG/DL (ref 70–99)
HCT VFR BLD AUTO: 40.2 % (ref 34–50)
HGB BLD-MCNC: 13 G/DL (ref 12–16)
HSV 1 SUBTYPE BY PCR: NOT DETECTED
HSV 2 SUBTYPE BY PCR: NOT DETECTED
IMMATURE GRANULOCYTE COUNT: 0.02 X10(3) UL (ref 0–1)
IMMATURE GRANULOCYTE RATIO %: 0.2 %
LYMPHOCYTES # BLD AUTO: 1.68 X10(3) UL (ref 0.9–4)
LYMPHOCYTES NFR BLD AUTO: 19.3 %
MCH RBC QN AUTO: 28 PG (ref 27–33.2)
MCHC RBC AUTO-ENTMCNC: 32.3 G/DL (ref 31–37)
MCV RBC AUTO: 86.5 FL (ref 81–100)
MONOCYTES # BLD AUTO: 1.12 X10(3) UL (ref 0.1–0.6)
MONOCYTES NFR BLD AUTO: 12.8 %
NEUTROPHIL ABS PRELIM: 5.71 X10 (3) UL (ref 1.3–6.7)
NEUTROPHILS # BLD AUTO: 5.71 X10(3) UL (ref 1.3–6.7)
NEUTROPHILS NFR BLD AUTO: 65.6 %
PLATELET # BLD AUTO: 209 10(3)UL (ref 150–450)
POTASSIUM SERPL-SCNC: 3.6 MMOL/L (ref 3.6–5.1)
RBC # BLD AUTO: 4.65 X10(6)UL (ref 3.8–5.1)
RED CELL DISTRIBUTION WIDTH-SD: 41.7 FL (ref 35.1–46.3)
SODIUM SERPL-SCNC: 140 MMOL/L (ref 136–144)
WBC # BLD AUTO: 8.7 X10(3) UL (ref 4–13)

## 2018-01-09 PROCEDURE — 99232 SBSQ HOSP IP/OBS MODERATE 35: CPT | Performed by: HOSPITALIST

## 2018-01-09 PROCEDURE — 99233 SBSQ HOSP IP/OBS HIGH 50: CPT | Performed by: OTHER

## 2018-01-09 NOTE — PAYOR COMM NOTE
--------------  CONTINUED STAY REVIEW    Payor: Chana Gross  Subscriber #:  [id number]  Authorization Number: [id number]    Admit date: 1/7/18  Admit time: [facility]    Admitting Physician: Adams Bateman MD  Attending Physician:  Lesley Alonzo CN: PERRL, EOMI without nystagmus, VFF, smile symmetric, sensation intact, tongue and palate midline, SCM intact; otherwise, 2-12 intact  Motor: no focal weakness; antigravity in all 4 extremities   Sensory: intact to light touch throughout  Coord: FNF int

## 2018-01-09 NOTE — PROGRESS NOTES
[id number] [facility] Progress Note    Genaro Negro Patient Status:  Inpatient    1950 MRN [id number]   [facility] 4NW-A Attending Evangelist Pedersen MD   [facility] Day # 2 PCP Enma Valerio DO         Subjective:  Aurelia Mercado secondary to above, improving  · EEG consistent with encephalopathy   · MRI neg for acute processes or structural pathology    Plan:  Supportive care  ID following, on acyclovir, off abx    Patient with mild improvement daily per staff who know her.   CPM. EOMI without nystagmus, VFF, smile symmetric, sensation intact, tongue and palate midline, SCM intact; otherwise, 2-12 intact  Motor: no focal weakness; antigravity in all 4 extremities   Sensory: intact to light touch throughout  Coord: FNF intact on R si no evidence of an acute infarct. Dictated by: Hardin Cooks, MD on 1/07/2018 at 17:47     Approved by: Hardin Cooks, MD              EEG (1/7/18):      The resting record is mildly disorganized and consists of 7.5-8.5 cps activity in the [employer]

## 2018-01-09 NOTE — CM/SW NOTE
01/09/18 1500   CM/SW Referral Data   Referral Source Social Work (self-referral)   Reason for Referral Discharge planning   Informant Spouse   Patient Info   Patient's Mental Status Confused   Patient's [address]   Patient lives with Liv Rizvi

## 2018-01-09 NOTE — PROGRESS NOTES
[facility]  Progress Note     Bobbe Davis Patient Status:  Inpatient    1950 MRN [id number]   [facility] 4NW-A Attending Neal Silva MD   Hosp Day # 2 PCP Kody Ceja DO     Chief Complaint: AMS    S: Mental statu results for input(s): TROP, CK in the last 72 hours. Imaging: Imaging data reviewed in Epic.     Medications:   • potassium chloride 40mEq IVPB (peripheral line)  40 mEq Intravenous Once   • Normal Saline Flush  10 mL Intravenous Q12H   • acyclovir

## 2018-01-09 NOTE — PROGRESS NOTES
Patient sleeping on and off. Confused,crying when awake,unable to say what is wrong. Denies pain. Incont. Labs drawn from midline. Resting quietly at this time.

## 2018-01-09 NOTE — PROGRESS NOTES
[facility]                INFECTIOUS DISEASE PROGRESS NOTE    Keith Escobar Patient Status:  Inpatient    1950 MRN [id number]   [facility] 4NW-A Attending Debbie Donald MD   [address] [facility] Day # 2 PCP Yue Villa DO     Antib Blood Culture Result No Growth 1 Day   Blood Culture FREQ X 2 [[id number]] Collected: 01/06/18 1627   Order Status: Completed Lab Status: Preliminary result Updated: 01/07/18 1700   Specimen: Blood from Blood,peripheral     Blood Culture Result No Growth 1

## 2018-01-09 NOTE — SLP NOTE
SPEECH DAILY NOTE - INPATIENT    ASSESSMENT & PLAN   ASSESSMENT  Pt seen for meal assess/dysphagia therapy to monitor po tolerance of recommended diet and ensure adherence to aspiration precautions. Pt awake and confused.   Accurate, however delayed respon regular/soft consistency and thin liquids without overt signs or symptoms of aspiration with 95 % accuracy over 1-2 session(s).   In progress   Goal #2 The patient/family/caregiver will demonstrate understanding and implementation of aspiration precautions

## 2018-01-10 VITALS
TEMPERATURE: 98 F | HEART RATE: 81 BPM | OXYGEN SATURATION: 96 % | DIASTOLIC BLOOD PRESSURE: 76 MMHG | WEIGHT: 211 LBS | BODY MASS INDEX: 36 KG/M2 | SYSTOLIC BLOOD PRESSURE: 117 MMHG | RESPIRATION RATE: 19 BRPM

## 2018-01-10 LAB
POTASSIUM SERPL-SCNC: 3.2 MMOL/L (ref 3.6–5.1)
WEST NILE VIRUS AB IGG CSF: 0.11 IV
WEST NILE VIRUS AB IGM CSF: 0 IV

## 2018-01-10 PROCEDURE — 99233 SBSQ HOSP IP/OBS HIGH 50: CPT | Performed by: OTHER

## 2018-01-10 PROCEDURE — 99239 HOSP IP/OBS DSCHRG MGMT >30: CPT | Performed by: HOSPITALIST

## 2018-01-10 RX ORDER — LEVOTHYROXINE SODIUM 88 UG/1
88 TABLET ORAL
Status: DISCONTINUED | OUTPATIENT
Start: 2018-01-10 | End: 2018-01-10

## 2018-01-10 NOTE — SLP NOTE
SPEECH DAILY NOTE - INPATIENT    ASSESSMENT & PLAN   ASSESSMENT  Pt seen for meal assess/dysphagia therapy to monitor po tolerance of recommended diet and ensure adherence to aspiration precautions.   Pt demonstrating improved verbal expression and shorter strategies as outlined by  BSSE (clinical evaluation) including Slow rate, Small bites, Small sips, Upright 90 degrees, Supervision with meals with mod assistance 100 % of the time across 2 sessions. Goal met   Goal #4 Cognitive eval as schedule permits.

## 2018-01-10 NOTE — PROGRESS NOTES
[facility]                INFECTIOUS DISEASE PROGRESS NOTE    Alba Burrell Patient Status:  Inpatient    1950 MRN [id number]   [facility] 4NW-A Attending Onel Emmanuel MD   [facility] [place] Day # 3 PCP Becca Martines DO     Antib Result No Growth 1 Day     Cell Count/Diff CSF [[id number]] Collected: 01/06/18 2222   Specimen: Cerebral spinal fluid Updated: 01/07/18 0120    Neutrophils CSF 0 %     Lymphocytes CSF 63 %     Mono/Macrophages CSF 37 %     Eosinophils CSF 0 %     Basophil

## 2018-01-10 NOTE — PROGRESS NOTES
Assumed care at this time;pt.up in chair;noted having difficulty closing the lid of her POP. alert knows her name and inqured about her thyroid med;ordered from RX and given;  Pt.  Ambulated in hallway with cora;denies any lightheadedness,dizziness nor SO

## 2018-01-10 NOTE — PROGRESS NOTES
Patient more interactive when  is here. During the night patient has spells of crying. Unsure where she is. Ambulated to bathroom with 1 assist this AM.No complaint of pain.

## 2018-01-10 NOTE — OCCUPATIONAL THERAPY NOTE
OCCUPATIONAL THERAPY TREATMENT NOTE - INPATIENT     Room Number: 415/415-A  Session: 1   Number of Visits to Meet Established Goals: 5    Presenting Problem: suspected viral meningitis    History related to current admission: Pt was admitted from home on 1 Score (AM-PAC Scale): 35.96  CMS Modifier (G-Code): CK    FUNCTIONAL TRANSFER ASSESSMENT  Supine to Sit : Not tested  Sit to Stand: Minimum assistance    Skilled Therapy Provided: Pt seen seated bedside chair. Min (A) sit to stand via RW.  Min (A) ambulatio eating: with mod assist-met 1/10, upgrade to supervision  Patient will perform grooming: with mod assist-met 1/10, upgrade to supervision  Patient will perform toileting: with mod assist-met 1/10, upgrade to supervision     Functional Transfer Goals  Patie

## 2018-01-10 NOTE — PROGRESS NOTES
[address]  Neurology Progress Note    Rolf Reynolds Patient Status:  Inpatient    1950 MRN [id number]   [facility] 4NW-A Attending Mikeyjoleen Bentley, [address] [facility] [address] Day # 3 PCP Corinne Reasons, DO         Subjective:  Melvina Cristina likely secondary to above, improving  · EEG consistent with encephalopathy   · MRI limited, but neg for acute processes or structural pathology     Plan:  Supportive care  ID following, off abx and off acyclovir  OT/ST recommends home with [facility], pt unsure if noted above       Assessment/Plan:   Viral meningitis  Encephalitis       MRI and EEG negative for structural pathology or epileptiform activity and EEG most consistent with encephalopathy - MRI was limited but no stroke or obvious T2/FLAIR changes on limi

## 2018-01-10 NOTE — PROGRESS NOTES
Patient presented sitting upright in bedside chair; VSS and agreeable to participate. Transferred sit>stand w/supervision assist.  Ambulated 300' w/SBA sans AD. Upon completion, patient made comfortable in bed side chair with call light in reach.

## 2018-01-10 NOTE — CONSULTS
.Nino Woodruff [address] Patient Status:  Inpatient    1950 MRN [id number]   [facility] 4NW-A Attending Justin Lares MD   [address] [facility] Day # 2 PCP Navya Gorman DO     Patient Identification  Raza Swift is a 78 year o caregivers): spouse / significant other she also has adult son and brother-in-law present. She works full-time in scheduling at [employer] / Accessibility: 2-story house  Current Functional Status:  Total assist to maximal assistanc injection 1 mg 1 mg Intravenous Once   [COMPLETED] LORazepam (ATIVAN) 2 MG/ML injection      [COMPLETED] LORazepam (ATIVAN) injection 1 mg 1 mg Intravenous Once          Smoking status: Never Smoker    Smokeless tobacco: Never Used    Alcohol use No tenderness or deformity. Cardiovascular:  Heart with regular rate rhythm, no murmurs appreciated. Radial and pedal pulses good. No cyanosis in all extremities. Abdomen:   No tenderness guarding or rigidity. Liver and spleen are not enlarged.   Bowel austen assistive device needs. 24 hr rehab nursing also beneficial for medication management, pressure sore prevention, bowel and bladder management, skin management.      Physiatric medical oversight to monitor kidney function, cardiac function, pulmon

## 2018-01-10 NOTE — CM/SW NOTE
SW informed patient that PT has changed their recommendation from acute care to home with [facility] . Pt declined [facility]. Pt stated that she is just tired, lives in a ranch home and has the assistance of her  and brother.    Morgan Murray, 01/10/18, 11:06

## 2018-01-10 NOTE — CM/SW NOTE
Pt now wanting [facility]. Pt did not have an agency preference. Pt agreeable to [facility].  EDMOND paged [facility] w/referral

## 2018-01-10 NOTE — CONSULTS
[facility] Pharmacy Note: Route Optimization for Levothyroxine (SYNTHROID)    Patient is currently on Levothyroxine (SYNTHROID) 44 mcg IV daily.    The patient meets the criteria to convert to the oral equivalent as established by the IV to Oral conversion protoco

## 2018-01-10 NOTE — PROGRESS NOTES
[facility]  Progress Note     Fabio Barnes Patient Status:  Inpatient    1950 MRN [id number]   [facility] 4NW-A Attending Yaima Goetz MD   [facility] Day # 3 PCP Lambert eLe DO     Chief Complaint: AMS    S: Patient aler viral meningitis - LP with elevated WBCs and protein, normal glucose   1. HSV negative  2.  Hypokalemia     Plan of care: DC to [place]    Quality:  · DVT Prophylaxis: scd  · CODE status: full  · Ceron: none  · Central line: none    Estimated date of discharge:

## 2018-01-10 NOTE — PHYSICAL THERAPY NOTE
PHYSICAL THERAPY TREATMENT NOTE - INPATIENT    Room Number: 415/415-A     Session: 1   Number of Visits to Meet Established Goals: 5    Presenting Problem: AMS     History related to current admission: Pt was admitted from home on 1/6/2018 with AMs.  Pt ha Little   How much help from another person does the patient currently need. ..   -   Moving to and from a bed to a chair (including a wheelchair)?: A Little   -   Need to walk in hospital room?: A Little   -   Climbing 3-5 steps with a railing?: A Little in strength, balance, endurance, mobility and functional independence, and will benefit from continued skilled PT during patient's IP stay. Pt making good progress with therapy services. Noted pt to have significantly improved cognition this date.   Pt ap

## 2018-01-11 ENCOUNTER — MED REC SCAN ONLY (OUTPATIENT)
Dept: FAMILY MEDICINE CLINIC | Facility: CLINIC | Age: 68
End: 2018-01-11

## 2018-01-11 NOTE — DISCHARGE SUMMARY
[facility] [place] [facility]  DISCHARGE SUMMARY     Blanquita Staley Patient Status:  Inpatient    1950 MRN [id number]   [facility] 4NW-A Attending No att. providers found   Hosp Day # 3 PCP Socorro Pallas, DO     Date of Admission: 2018  Da right arm weakness with an [facility] physician at outside hospital stated that the family wanted to be transferred to [facility] arrival to THE [facility] ER patient with altered mental status and fever.    Denies any associated diarrhea.  Patient Caps  Commonly known as:  TAMIFLU               Follow-up appointment:   Elvia Douglas DO  [address] [place] [address] [address] [phone]    In 1 week      Yesika Luther MD  [address] [address] [place]

## 2018-01-11 NOTE — HOME CARE LIAISON
MET WITH PT[facility] TO DISCUSS HOME HEALTH SERVICES AND COVERAGE CRITERIA. [facility]NT AGREEABLE TO Doroteo Mccracken. PTNT GIVEN RESIDENTIAL BROCHURE. RESIDENTIAL WITH PROVIDE SN/PT/OT SLP ON DISCHARGE.     Thank you for this referral,   Janelle Jackson

## 2018-01-12 ENCOUNTER — TELEPHONE (OUTPATIENT)
Dept: FAMILY MEDICINE CLINIC | Facility: CLINIC | Age: 68
End: 2018-01-12

## 2018-01-12 NOTE — TELEPHONE ENCOUNTER
Received paperwork for patient for disability benefits to be filled out. Put in [address] [place] [address].

## 2018-01-16 ENCOUNTER — APPOINTMENT (OUTPATIENT)
Dept: LAB | Age: 68
End: 2018-01-16
Attending: FAMILY MEDICINE
Payer: COMMERCIAL

## 2018-01-16 ENCOUNTER — OFFICE VISIT (OUTPATIENT)
Dept: FAMILY MEDICINE CLINIC | Facility: CLINIC | Age: 68
End: 2018-01-16

## 2018-01-16 VITALS
BODY MASS INDEX: 34.49 KG/M2 | OXYGEN SATURATION: 98 % | HEIGHT: 65 IN | WEIGHT: 207 LBS | HEART RATE: 72 BPM | DIASTOLIC BLOOD PRESSURE: 80 MMHG | SYSTOLIC BLOOD PRESSURE: 120 MMHG | RESPIRATION RATE: 22 BRPM | TEMPERATURE: 98 F

## 2018-01-16 DIAGNOSIS — E87.6 LOW BLOOD POTASSIUM: ICD-10-CM

## 2018-01-16 DIAGNOSIS — G93.40 ACUTE ENCEPHALOPATHY: ICD-10-CM

## 2018-01-16 DIAGNOSIS — R53.1 GENERAL WEAKNESS: ICD-10-CM

## 2018-01-16 DIAGNOSIS — A87.9 VIRAL MENINGITIS: Primary | ICD-10-CM

## 2018-01-16 LAB — POTASSIUM SERPL-SCNC: 4.2 MMOL/L (ref 3.6–5.1)

## 2018-01-16 PROCEDURE — 99214 OFFICE O/P EST MOD 30 MIN: CPT | Performed by: FAMILY MEDICINE

## 2018-01-16 PROCEDURE — 84132 ASSAY OF SERUM POTASSIUM: CPT

## 2018-01-16 PROCEDURE — 36415 COLL VENOUS BLD VENIPUNCTURE: CPT

## 2018-01-16 NOTE — PROGRESS NOTES
HPI:   Karen Wyatt is a 78year old female here for HFU    Pt was admitted for confusion   Comprehensive work up was + for viral meningitis and acute encephalopathy    K was low     Pt at home   Appetite is slowly improving   Pt reports she feels ve history  ALL/ASTHMA: denies asthma    EXAM:   /80   Pulse 72   Temp 98.4 °F (36.9 °C) (Oral)   Resp 22   Ht 65\"   Wt 207 lb   SpO2 98%   BMI 34.45 kg/m²   Body mass index is 34.45 kg/m².    GENERAL: alert and oriented X 3, well developed, well nouris

## 2018-01-18 ENCOUNTER — TELEPHONE (OUTPATIENT)
Dept: FAMILY MEDICINE CLINIC | Facility: CLINIC | Age: 68
End: 2018-01-18

## 2018-01-23 ENCOUNTER — TELEPHONE (OUTPATIENT)
Dept: FAMILY MEDICINE CLINIC | Facility: CLINIC | Age: 68
End: 2018-01-23

## 2018-01-24 ENCOUNTER — OFFICE VISIT (OUTPATIENT)
Dept: NEUROLOGY | Facility: CLINIC | Age: 68
End: 2018-01-24

## 2018-01-24 VITALS
HEIGHT: 65 IN | BODY MASS INDEX: 34.49 KG/M2 | HEART RATE: 73 BPM | RESPIRATION RATE: 16 BRPM | DIASTOLIC BLOOD PRESSURE: 69 MMHG | SYSTOLIC BLOOD PRESSURE: 116 MMHG | WEIGHT: 207 LBS

## 2018-01-24 DIAGNOSIS — G04.90 ENCEPHALITIS: Primary | ICD-10-CM

## 2018-01-24 PROCEDURE — 99214 OFFICE O/P EST MOD 30 MIN: CPT | Performed by: OTHER

## 2018-01-24 NOTE — PATIENT INSTRUCTIONS
Please have labs done   folow up in 2 months   Refill policies:    • Allow 2-3 business days for refills; controlled substances may take longer.   • Contact your pharmacy at least 5 days prior to running out of medication and have them send an electronic re Authorizations  If your physician has recommended that you have a procedure or additional testing performed. [facility]) will contact your insurance carrier to obtain pre-certification or prior authorization.     Unfortunately, MICHAEL

## 2018-01-24 NOTE — TELEPHONE ENCOUNTER
Form completed, signed per LE, faxed to [facility] [phone] with confirmation received. Pt informed. Sent to scan, copy in Triage Accordion file. Task completed.

## 2018-01-24 NOTE — PROGRESS NOTES
[facility] Progress Note    HPI  Patient presents with:  Hospital F/U: Viral Meningitis      Patient previously admitted 1/6 -1/10/18 for altered mental status, found to have elevated CSF protein with findings consistent with meningitis History Main Topics    Smoking status: Never Smoker                                                                Smokeless tobacco: Never Used                        Alcohol use:  No              Drug use: No            Other Topics            Concern  Ca intact bilaterally  Romberg: absent  Gait: normal casual, heel, toe and tandem gait    Labs:  None new - CSF as summarized in HPI    Imaging:  None new    MRI during admission: normal MRI brain           Impression/ Plan:  Blanquita Staley is a 80 year o

## 2018-01-25 ENCOUNTER — APPOINTMENT (OUTPATIENT)
Dept: LAB | Age: 68
End: 2018-01-25
Attending: Other
Payer: COMMERCIAL

## 2018-01-25 DIAGNOSIS — G04.90 ENCEPHALITIS: ICD-10-CM

## 2018-01-25 PROCEDURE — 86255 FLUORESCENT ANTIBODY SCREEN: CPT

## 2018-01-26 ENCOUNTER — TELEPHONE (OUTPATIENT)
Dept: FAMILY MEDICINE CLINIC | Facility: CLINIC | Age: 68
End: 2018-01-26

## 2018-01-29 ENCOUNTER — TELEPHONE (OUTPATIENT)
Dept: FAMILY MEDICINE CLINIC | Facility: CLINIC | Age: 68
End: 2018-01-29

## 2018-01-29 NOTE — TELEPHONE ENCOUNTER
fyi- patient being discharged early from [facility]. has met goals. Does not met criteria for homebound. she had 1 visit left.

## 2018-01-31 ENCOUNTER — TELEPHONE (OUTPATIENT)
Dept: NEUROLOGY | Facility: CLINIC | Age: 68
End: 2018-01-31

## 2018-01-31 NOTE — TELEPHONE ENCOUNTER
----- Message from Sudarshan Mendoza MD sent at 1/29/2018  8:57 AM CST -----  Results noted, paraneoplastic Ab panel negative; let patient know

## 2018-02-06 ENCOUNTER — OFFICE VISIT (OUTPATIENT)
Dept: FAMILY MEDICINE CLINIC | Facility: CLINIC | Age: 68
End: 2018-02-06

## 2018-02-06 VITALS
WEIGHT: 208 LBS | BODY MASS INDEX: 34.66 KG/M2 | OXYGEN SATURATION: 99 % | DIASTOLIC BLOOD PRESSURE: 80 MMHG | HEIGHT: 65 IN | RESPIRATION RATE: 20 BRPM | TEMPERATURE: 99 F | SYSTOLIC BLOOD PRESSURE: 126 MMHG | HEART RATE: 76 BPM

## 2018-02-06 DIAGNOSIS — Z86.61 HISTORY OF MENINGITIS: ICD-10-CM

## 2018-02-06 DIAGNOSIS — R41.82 ALTERED MENTAL STATUS, UNSPECIFIED ALTERED MENTAL STATUS TYPE: Primary | ICD-10-CM

## 2018-02-06 DIAGNOSIS — G04.90 ENCEPHALITIS: ICD-10-CM

## 2018-02-06 PROCEDURE — 99213 OFFICE O/P EST LOW 20 MIN: CPT | Performed by: FAMILY MEDICINE

## 2018-02-06 RX ORDER — LEVOTHYROXINE SODIUM 88 UG/1
88 TABLET ORAL
Qty: 90 TABLET | Refills: 3 | Status: SHIPPED | OUTPATIENT
Start: 2018-02-06 | End: 2019-01-24

## 2018-02-06 NOTE — PROGRESS NOTES
HPI:   Burke Phillip is a 80year old female here for return to work note   Pt was admitted for confusion   Comprehensive work up was + for viral meningitis and acute encephalopathy    Pt feels ready to return to work   Pt feels she is almost back to anemia  ENDOCRINE: denies thyroid history  ALL/ASTHMA: denies asthma    EXAM:   /80   Pulse 76   Temp 98.7 °F (37.1 °C) (Oral)   Resp 20   Ht 65\"   Wt 208 lb   SpO2 99%   BMI 34.61 kg/m²   Body mass index is 34.61 kg/m².    GENERAL: alert and oriente

## 2018-03-03 ENCOUNTER — HOSPITAL ENCOUNTER (OUTPATIENT)
Dept: MAMMOGRAPHY | Facility: HOSPITAL | Age: 68
Discharge: HOME | End: 2018-03-03
Attending: FAMILY MEDICINE
Payer: COMMERCIAL

## 2018-03-03 DIAGNOSIS — Z12.39 SCREENING BREAST EXAMINATION: ICD-10-CM

## 2018-03-03 PROCEDURE — 77063 BREAST TOMOSYNTHESIS BI: CPT | Performed by: FAMILY MEDICINE

## 2018-03-03 PROCEDURE — 77067 SCR MAMMO BI INCL CAD: CPT | Performed by: FAMILY MEDICINE

## 2018-05-11 ENCOUNTER — OFFICE VISIT (OUTPATIENT)
Dept: NEUROLOGY | Facility: CLINIC | Age: 68
End: 2018-05-11

## 2018-05-11 VITALS
HEIGHT: 65 IN | SYSTOLIC BLOOD PRESSURE: 120 MMHG | DIASTOLIC BLOOD PRESSURE: 68 MMHG | BODY MASS INDEX: 34.66 KG/M2 | WEIGHT: 208 LBS | HEART RATE: 78 BPM | RESPIRATION RATE: 16 BRPM

## 2018-05-11 DIAGNOSIS — G04.90 ENCEPHALITIS: Primary | ICD-10-CM

## 2018-05-11 PROCEDURE — 99213 OFFICE O/P EST LOW 20 MIN: CPT | Performed by: OTHER

## 2018-05-11 NOTE — PATIENT INSTRUCTIONS
Refill policies:    • Allow 2-3 business days for refills; controlled substances may take longer.   • Contact your pharmacy at least 5 days prior to running out of medication and have them send an electronic request or submit request through the “request re entire amount billed. Precertification and Prior Authorizations: If your physician has recommended that you have a procedure or additional testing performed.   [facility]) will contact your insurance carrier to obtain pre-certi

## 2018-05-11 NOTE — PROGRESS NOTES
[facility] Progress Note    HPI  Patient presents with:  Neurologic Problem:  Follow up on Encephalitis    In summary of prior notes:  \"  Patient previously admitted 1/6 -1/10/18 for altered mental status, found to have elevated CSF pro GALLBLADDER  Family History   Problem Relation Age of Onset   • Colon Cancer Father    • Heart Attack Father    • Depression Paternal Grandmother    • hardening of the arteries [Other] [OTHER] Paternal Grandmother    • brain cancer [Other] [OTHER] Brother (visuospatial / executive: 5/5, naming: 3/3, attention: 5/6, language: 3/3, abstraction: 2/2, delayed recall 4/5, orientation: 6/6)         CN: PERRL, EOMI with no nystagmus, VFF, smile symmetric, sensation intact, tongue and palate midline, SCM/hearing in

## 2019-01-12 ENCOUNTER — OFFICE VISIT (OUTPATIENT)
Dept: FAMILY MEDICINE CLINIC | Facility: CLINIC | Age: 69
End: 2019-01-12
Payer: COMMERCIAL

## 2019-01-12 ENCOUNTER — LABORATORY ENCOUNTER (OUTPATIENT)
Dept: LAB | Age: 69
End: 2019-01-12
Attending: FAMILY MEDICINE
Payer: COMMERCIAL

## 2019-01-12 VITALS
TEMPERATURE: 98 F | OXYGEN SATURATION: 98 % | DIASTOLIC BLOOD PRESSURE: 78 MMHG | RESPIRATION RATE: 20 BRPM | SYSTOLIC BLOOD PRESSURE: 120 MMHG | HEART RATE: 78 BPM | BODY MASS INDEX: 37.02 KG/M2 | WEIGHT: 222.19 LBS | HEIGHT: 65 IN

## 2019-01-12 DIAGNOSIS — M81.8 OTHER OSTEOPOROSIS, UNSPECIFIED PATHOLOGICAL FRACTURE PRESENCE: ICD-10-CM

## 2019-01-12 DIAGNOSIS — Z00.00 ROUTINE ADULT HEALTH MAINTENANCE: Primary | ICD-10-CM

## 2019-01-12 DIAGNOSIS — Z12.31 ENCOUNTER FOR SCREENING MAMMOGRAM FOR MALIGNANT NEOPLASM OF BREAST: ICD-10-CM

## 2019-01-12 DIAGNOSIS — Z00.00 ROUTINE ADULT HEALTH MAINTENANCE: ICD-10-CM

## 2019-01-12 DIAGNOSIS — E04.2 MULTIPLE THYROID NODULES: ICD-10-CM

## 2019-01-12 LAB
ALBUMIN SERPL-MCNC: 4 G/DL (ref 3.1–4.5)
ALBUMIN/GLOB SERPL: 1.1 {RATIO} (ref 1–2)
ALP LIVER SERPL-CCNC: 78 U/L (ref 55–142)
ALT SERPL-CCNC: 31 U/L (ref 14–54)
ANION GAP SERPL CALC-SCNC: 6 MMOL/L (ref 0–18)
AST SERPL-CCNC: 23 U/L (ref 15–41)
BASOPHILS # BLD AUTO: 0.04 X10(3) UL (ref 0–0.1)
BASOPHILS NFR BLD AUTO: 0.9 %
BILIRUB SERPL-MCNC: 1.5 MG/DL (ref 0.1–2)
BUN BLD-MCNC: 15 MG/DL (ref 8–20)
BUN/CREAT SERPL: 13.3 (ref 10–20)
CALCIUM BLD-MCNC: 9.1 MG/DL (ref 8.3–10.3)
CHLORIDE SERPL-SCNC: 106 MMOL/L (ref 101–111)
CHOLEST SMN-MCNC: 216 MG/DL (ref ?–200)
CO2 SERPL-SCNC: 30 MMOL/L (ref 22–32)
CREAT BLD-MCNC: 1.13 MG/DL (ref 0.55–1.02)
EOSINOPHIL # BLD AUTO: 0.12 X10(3) UL (ref 0–0.3)
EOSINOPHIL NFR BLD AUTO: 2.6 %
ERYTHROCYTE [DISTWIDTH] IN BLOOD BY AUTOMATED COUNT: 13.6 % (ref 11.5–16)
GLOBULIN PLAS-MCNC: 3.5 G/DL (ref 2.8–4.4)
GLUCOSE BLD-MCNC: 96 MG/DL (ref 70–99)
HCT VFR BLD AUTO: 50.4 % (ref 34–50)
HDLC SERPL-MCNC: 49 MG/DL (ref 40–59)
HGB BLD-MCNC: 15.5 G/DL (ref 12–16)
IMMATURE GRANULOCYTE COUNT: 0.01 X10(3) UL (ref 0–1)
IMMATURE GRANULOCYTE RATIO %: 0.2 %
LDLC SERPL CALC-MCNC: 145 MG/DL (ref ?–100)
LYMPHOCYTES # BLD AUTO: 1.63 X10(3) UL (ref 0.9–4)
LYMPHOCYTES NFR BLD AUTO: 35.7 %
M PROTEIN MFR SERPL ELPH: 7.5 G/DL (ref 6.4–8.2)
MCH RBC QN AUTO: 27.5 PG (ref 27–33.2)
MCHC RBC AUTO-ENTMCNC: 30.8 G/DL (ref 31–37)
MCV RBC AUTO: 89.4 FL (ref 81–100)
MONOCYTES # BLD AUTO: 0.43 X10(3) UL (ref 0.1–1)
MONOCYTES NFR BLD AUTO: 9.4 %
NEUTROPHIL ABS PRELIM: 2.34 X10 (3) UL (ref 1.3–6.7)
NEUTROPHILS # BLD AUTO: 2.34 X10(3) UL (ref 1.3–6.7)
NEUTROPHILS NFR BLD AUTO: 51.2 %
NONHDLC SERPL-MCNC: 167 MG/DL (ref ?–130)
OSMOLALITY SERPL CALC.SUM OF ELEC: 295 MOSM/KG (ref 275–295)
PLATELET # BLD AUTO: 227 10(3)UL (ref 150–450)
POTASSIUM SERPL-SCNC: 4.3 MMOL/L (ref 3.6–5.1)
RBC # BLD AUTO: 5.64 X10(6)UL (ref 3.8–5.1)
RED CELL DISTRIBUTION WIDTH-SD: 44.6 FL (ref 35.1–46.3)
SODIUM SERPL-SCNC: 142 MMOL/L (ref 136–144)
TRIGL SERPL-MCNC: 112 MG/DL (ref 30–149)
TSI SER-ACNC: 1 MIU/ML (ref 0.35–5.5)
VIT D+METAB SERPL-MCNC: 69.4 NG/ML (ref 30–100)
VLDLC SERPL CALC-MCNC: 22 MG/DL (ref 0–30)
WBC # BLD AUTO: 4.6 X10(3) UL (ref 4–13)

## 2019-01-12 PROCEDURE — 82306 VITAMIN D 25 HYDROXY: CPT

## 2019-01-12 PROCEDURE — 84443 ASSAY THYROID STIM HORMONE: CPT

## 2019-01-12 PROCEDURE — 99397 PER PM REEVAL EST PAT 65+ YR: CPT | Performed by: FAMILY MEDICINE

## 2019-01-12 PROCEDURE — 36415 COLL VENOUS BLD VENIPUNCTURE: CPT

## 2019-01-12 PROCEDURE — 85025 COMPLETE CBC W/AUTO DIFF WBC: CPT

## 2019-01-12 PROCEDURE — 80061 LIPID PANEL: CPT

## 2019-01-12 PROCEDURE — 80053 COMPREHEN METABOLIC PANEL: CPT

## 2019-01-12 NOTE — PROGRESS NOTES
First-time visitor to the practice here for a well woman exam and will hold Pap until next year. Works at central scheduling in the hospital.   has 2 children had 2 uneventful pregnancies.   Only medicine is that of levothyroxine 88 mcg daily does h

## 2019-01-14 ENCOUNTER — TELEPHONE (OUTPATIENT)
Dept: FAMILY MEDICINE CLINIC | Facility: CLINIC | Age: 69
End: 2019-01-14

## 2019-01-14 NOTE — TELEPHONE ENCOUNTER
Jose Juan Rodriguez from [facility] is requesting to please correct the dx on the mammogram order. Routine adult screening does not pertain to a mammogram it should say something like screening mammogram. Please correct order or call Jose Juan Rodriguez.

## 2019-01-22 ENCOUNTER — TELEPHONE (OUTPATIENT)
Dept: FAMILY MEDICINE CLINIC | Facility: CLINIC | Age: 69
End: 2019-01-22

## 2019-01-22 DIAGNOSIS — Z11.59 NEED FOR HEPATITIS C SCREENING TEST: Primary | ICD-10-CM

## 2019-01-22 NOTE — TELEPHONE ENCOUNTER
----- Message from Brittni Boudreaux DO sent at 1/20/2019 11:42 AM CST -----  Blood tests look fine       jg

## 2019-01-24 RX ORDER — LEVOTHYROXINE SODIUM 88 UG/1
TABLET ORAL
Qty: 90 TABLET | Refills: 3 | Status: SHIPPED | OUTPATIENT
Start: 2019-01-24 | End: 2019-01-24

## 2019-01-24 RX ORDER — LEVOTHYROXINE SODIUM 88 UG/1
TABLET ORAL
Qty: 90 TABLET | Refills: 3 | Status: SHIPPED | OUTPATIENT
Start: 2019-01-24 | End: 2020-01-17

## 2019-01-24 NOTE — TELEPHONE ENCOUNTER
Last refill says 1/24/19 but it had to be cancelled since it was under PCP Marizol Carbajal and Dr Rony Hammer took over as PCP.  Medication was not filled on 1/24/19    Last Visit-1/12/19          Please Approve or Deny Medication

## 2019-01-26 ENCOUNTER — HOSPITAL ENCOUNTER (OUTPATIENT)
Dept: MAMMOGRAPHY | Age: 69
Discharge: HOME | End: 2019-01-26
Attending: FAMILY MEDICINE
Payer: COMMERCIAL

## 2019-01-26 ENCOUNTER — HOSPITAL ENCOUNTER (OUTPATIENT)
Dept: BONE DENSITY | Age: 69
Discharge: HOME | End: 2019-01-26
Attending: FAMILY MEDICINE
Payer: COMMERCIAL

## 2019-01-26 ENCOUNTER — HOSPITAL ENCOUNTER (OUTPATIENT)
Dept: ULTRASOUND IMAGING | Age: 69
Discharge: HOME | End: 2019-01-26
Attending: FAMILY MEDICINE
Payer: COMMERCIAL

## 2019-01-26 ENCOUNTER — APPOINTMENT (OUTPATIENT)
Dept: LAB | Age: 69
End: 2019-01-26
Attending: FAMILY MEDICINE
Payer: COMMERCIAL

## 2019-01-26 DIAGNOSIS — Z11.59 NEED FOR HEPATITIS C SCREENING TEST: ICD-10-CM

## 2019-01-26 DIAGNOSIS — M81.8 OTHER OSTEOPOROSIS, UNSPECIFIED PATHOLOGICAL FRACTURE PRESENCE: ICD-10-CM

## 2019-01-26 DIAGNOSIS — E04.2 MULTIPLE THYROID NODULES: ICD-10-CM

## 2019-01-26 DIAGNOSIS — Z12.31 ENCOUNTER FOR SCREENING MAMMOGRAM FOR MALIGNANT NEOPLASM OF BREAST: ICD-10-CM

## 2019-01-26 LAB — HCV AB SERPL QL IA: NONREACTIVE

## 2019-01-26 PROCEDURE — 77067 SCR MAMMO BI INCL CAD: CPT | Performed by: FAMILY MEDICINE

## 2019-01-26 PROCEDURE — 36415 COLL VENOUS BLD VENIPUNCTURE: CPT

## 2019-01-26 PROCEDURE — 77063 BREAST TOMOSYNTHESIS BI: CPT | Performed by: FAMILY MEDICINE

## 2019-01-26 PROCEDURE — 86803 HEPATITIS C AB TEST: CPT

## 2019-01-26 PROCEDURE — 76536 US EXAM OF HEAD AND NECK: CPT | Performed by: FAMILY MEDICINE

## 2019-01-26 PROCEDURE — 77080 DXA BONE DENSITY AXIAL: CPT | Performed by: FAMILY MEDICINE

## 2019-02-23 ENCOUNTER — OFFICE VISIT (OUTPATIENT)
Dept: FAMILY MEDICINE CLINIC | Facility: CLINIC | Age: 69
End: 2019-02-23
Payer: COMMERCIAL

## 2019-02-23 VITALS
TEMPERATURE: 98 F | HEART RATE: 94 BPM | SYSTOLIC BLOOD PRESSURE: 120 MMHG | OXYGEN SATURATION: 97 % | RESPIRATION RATE: 20 BRPM | DIASTOLIC BLOOD PRESSURE: 84 MMHG | WEIGHT: 223.38 LBS | BODY MASS INDEX: 37 KG/M2

## 2019-02-23 DIAGNOSIS — J06.9 VIRAL UPPER RESPIRATORY TRACT INFECTION: Primary | ICD-10-CM

## 2019-02-23 PROCEDURE — 99213 OFFICE O/P EST LOW 20 MIN: CPT | Performed by: NURSE PRACTITIONER

## 2019-02-23 RX ORDER — AMOXICILLIN AND CLAVULANATE POTASSIUM 875; 125 MG/1; MG/1
1 TABLET, FILM COATED ORAL 2 TIMES DAILY
Qty: 14 TABLET | Refills: 0 | Status: SHIPPED | OUTPATIENT
Start: 2019-02-23 | End: 2019-03-02

## 2019-02-23 RX ORDER — FLUTICASONE PROPIONATE 50 MCG
2 SPRAY, SUSPENSION (ML) NASAL DAILY
Qty: 1 BOTTLE | Refills: 0 | Status: SHIPPED | OUTPATIENT
Start: 2019-02-23 | End: 2020-01-02 | Stop reason: ALTCHOICE

## 2019-02-23 NOTE — PROGRESS NOTES
CHIEF COMPLAINT:   Patient presents with:  Sinus Problem: face pressure, eyes pain, blow out green and yellow mucus x4days    HPI:   Arelis Parker is a 77year old female who presents for sinus congestion for  4  days.  Symptoms have been worsening sin NEURO: + sinus headaches. No numbness or tingling in face.     EXAM:   /84   Pulse 94   Temp 98.4 °F (36.9 °C) (Oral)   Resp 20   Wt 223 lb 6.4 oz   SpO2 97%   BMI 37.18 kg/m²   GENERAL: well developed, well nourished,in no apparent distress  SKIN: n Risks, benefits, side effects of medication addressed and explained. Patient Instructions     Viral Upper Respiratory Illness (Adult)  You have a viral upper respiratory illness (URI), which is another term for the common cold.  This illness is contagiou Follow up with your healthcare provider, or as advised.   When to seek medical advice  Call your healthcare provider right away if any of these occur:  · Cough with lots of colored sputum (mucus)  · Severe headache; face, neck, or ear pain  · Difficulty swa

## 2019-03-14 ENCOUNTER — HOSPITAL ENCOUNTER (OUTPATIENT)
Age: 69
Discharge: HOME | End: 2019-03-14
Attending: FAMILY MEDICINE
Payer: COMMERCIAL

## 2019-03-14 VITALS
DIASTOLIC BLOOD PRESSURE: 82 MMHG | RESPIRATION RATE: 16 BRPM | SYSTOLIC BLOOD PRESSURE: 149 MMHG | TEMPERATURE: 99 F | HEART RATE: 77 BPM

## 2019-03-14 DIAGNOSIS — B02.9 HERPES ZOSTER WITHOUT COMPLICATION: Primary | ICD-10-CM

## 2019-03-14 PROCEDURE — 99204 OFFICE O/P NEW MOD 45 MIN: CPT

## 2019-03-14 PROCEDURE — 99213 OFFICE O/P EST LOW 20 MIN: CPT

## 2019-03-14 RX ORDER — VALACYCLOVIR HYDROCHLORIDE 1 G/1
1 TABLET, FILM COATED ORAL 3 TIMES DAILY
Qty: 21 TABLET | Refills: 0 | Status: SHIPPED | OUTPATIENT
Start: 2019-03-14 | End: 2019-03-21

## 2019-03-15 NOTE — ED PROVIDER NOTES
Patient Seen in: [address] [facility]    History   Patient presents with:  Rash Skin Problem (integumentary)    Stated Complaint: head bumps/ sore face    HPI  78 yo F here with complaints of bumps on the L side of the forehead and pain in thi normal  NECK: FROM, supple, anterior cervical LAD + L > R   LUNGS: CTAB, no RRW  CV: RRR  ABD: not distended  NEURO: Alert and oriented to person place and time  GAIT: Normal          ED Course   Labs Reviewed - No data to display  Orders Placed This Encou

## 2019-03-15 NOTE — ED INITIAL ASSESSMENT (HPI)
Pt sts 4-5 days ago began with a rash/sore to left temple with pain extending into left ear,jaw, neck, and top of head. No drng noted. Recent cold symptoms.

## 2019-04-18 PROCEDURE — 88305 TISSUE EXAM BY PATHOLOGIST: CPT | Performed by: INTERNAL MEDICINE

## 2019-05-10 ENCOUNTER — HOSPITAL ENCOUNTER (OUTPATIENT)
Dept: ULTRASOUND IMAGING | Facility: HOSPITAL | Age: 69
Discharge: HOME | End: 2019-05-10
Attending: OTOLARYNGOLOGY
Payer: COMMERCIAL

## 2019-05-10 DIAGNOSIS — E04.1 THYROID NODULE: ICD-10-CM

## 2019-05-10 DIAGNOSIS — E07.9 THYROID DYSFUNCTION: ICD-10-CM

## 2019-05-10 PROCEDURE — 10005 FNA BX W/US GDN 1ST LES: CPT | Performed by: OTOLARYNGOLOGY

## 2019-05-10 PROCEDURE — 88173 CYTOPATH EVAL FNA REPORT: CPT | Performed by: OTOLARYNGOLOGY

## 2019-12-28 ENCOUNTER — APPOINTMENT (OUTPATIENT)
Dept: GENERAL RADIOLOGY | Age: 69
End: 2019-12-28
Attending: EMERGENCY MEDICINE
Payer: COMMERCIAL

## 2019-12-28 ENCOUNTER — HOSPITAL ENCOUNTER (EMERGENCY)
Age: 69
Discharge: HOME | End: 2019-12-28
Attending: EMERGENCY MEDICINE
Payer: COMMERCIAL

## 2019-12-28 VITALS
SYSTOLIC BLOOD PRESSURE: 135 MMHG | OXYGEN SATURATION: 95 % | RESPIRATION RATE: 17 BRPM | HEART RATE: 83 BPM | DIASTOLIC BLOOD PRESSURE: 75 MMHG | HEIGHT: 65 IN | BODY MASS INDEX: 36.65 KG/M2 | TEMPERATURE: 98 F | WEIGHT: 220 LBS

## 2019-12-28 DIAGNOSIS — I10 HYPERTENSION, UNSPECIFIED TYPE: Primary | ICD-10-CM

## 2019-12-28 LAB
ALBUMIN SERPL-MCNC: 3.4 G/DL (ref 3.4–5)
ALBUMIN/GLOB SERPL: 1 {RATIO} (ref 1–2)
ALP LIVER SERPL-CCNC: 82 U/L (ref 55–142)
ALT SERPL-CCNC: 45 U/L (ref 13–56)
ANION GAP SERPL CALC-SCNC: 9 MMOL/L (ref 0–18)
AST SERPL-CCNC: 30 U/L (ref 15–37)
BASOPHILS # BLD AUTO: 0.03 X10(3) UL (ref 0–0.2)
BASOPHILS NFR BLD AUTO: 0.5 %
BILIRUB SERPL-MCNC: 1.2 MG/DL (ref 0.1–2)
BUN BLD-MCNC: 22 MG/DL (ref 7–18)
BUN/CREAT SERPL: 18.8 (ref 10–20)
CALCIUM BLD-MCNC: 8.4 MG/DL (ref 8.5–10.1)
CHLORIDE SERPL-SCNC: 105 MMOL/L (ref 98–112)
CO2 SERPL-SCNC: 26 MMOL/L (ref 21–32)
CREAT BLD-MCNC: 1.17 MG/DL (ref 0.55–1.02)
DEPRECATED RDW RBC AUTO: 43.5 FL (ref 35.1–46.3)
EOSINOPHIL # BLD AUTO: 0.17 X10(3) UL (ref 0–0.7)
EOSINOPHIL NFR BLD AUTO: 2.9 %
ERYTHROCYTE [DISTWIDTH] IN BLOOD BY AUTOMATED COUNT: 13.7 % (ref 11–15)
GLOBULIN PLAS-MCNC: 3.5 G/DL (ref 2.8–4.4)
GLUCOSE BLD-MCNC: 112 MG/DL (ref 70–99)
HCT VFR BLD AUTO: 44.2 % (ref 35–48)
HGB BLD-MCNC: 14.3 G/DL (ref 12–16)
IMM GRANULOCYTES # BLD AUTO: 0.01 X10(3) UL (ref 0–1)
IMM GRANULOCYTES NFR BLD: 0.2 %
LYMPHOCYTES # BLD AUTO: 2.14 X10(3) UL (ref 1–4)
LYMPHOCYTES NFR BLD AUTO: 36.5 %
M PROTEIN MFR SERPL ELPH: 6.9 G/DL (ref 6.4–8.2)
MCH RBC QN AUTO: 28.3 PG (ref 26–34)
MCHC RBC AUTO-ENTMCNC: 32.4 G/DL (ref 31–37)
MCV RBC AUTO: 87.4 FL (ref 80–100)
MONOCYTES # BLD AUTO: 0.46 X10(3) UL (ref 0.1–1)
MONOCYTES NFR BLD AUTO: 7.8 %
NEUTROPHILS # BLD AUTO: 3.05 X10 (3) UL (ref 1.5–7.7)
NEUTROPHILS # BLD AUTO: 3.05 X10(3) UL (ref 1.5–7.7)
NEUTROPHILS NFR BLD AUTO: 52.1 %
OSMOLALITY SERPL CALC.SUM OF ELEC: 294 MOSM/KG (ref 275–295)
PLATELET # BLD AUTO: 207 10(3)UL (ref 150–450)
POTASSIUM SERPL-SCNC: 3.8 MMOL/L (ref 3.5–5.1)
RBC # BLD AUTO: 5.06 X10(6)UL (ref 3.8–5.3)
SODIUM SERPL-SCNC: 140 MMOL/L (ref 136–145)
TROPONIN I SERPL-MCNC: <0.045 NG/ML (ref ?–0.04)
WBC # BLD AUTO: 5.9 X10(3) UL (ref 4–11)

## 2019-12-28 PROCEDURE — 84484 ASSAY OF TROPONIN QUANT: CPT | Performed by: EMERGENCY MEDICINE

## 2019-12-28 PROCEDURE — 99285 EMERGENCY DEPT VISIT HI MDM: CPT

## 2019-12-28 PROCEDURE — 71045 X-RAY EXAM CHEST 1 VIEW: CPT | Performed by: EMERGENCY MEDICINE

## 2019-12-28 PROCEDURE — 85025 COMPLETE CBC W/AUTO DIFF WBC: CPT | Performed by: EMERGENCY MEDICINE

## 2019-12-28 PROCEDURE — 93010 ELECTROCARDIOGRAM REPORT: CPT

## 2019-12-28 PROCEDURE — 93005 ELECTROCARDIOGRAM TRACING: CPT

## 2019-12-28 PROCEDURE — 96374 THER/PROPH/DIAG INJ IV PUSH: CPT

## 2019-12-28 PROCEDURE — 80053 COMPREHEN METABOLIC PANEL: CPT | Performed by: EMERGENCY MEDICINE

## 2019-12-28 RX ORDER — LABETALOL HYDROCHLORIDE 5 MG/ML
10 INJECTION, SOLUTION INTRAVENOUS ONCE
Status: COMPLETED | OUTPATIENT
Start: 2019-12-28 | End: 2019-12-28

## 2019-12-28 RX ORDER — HYDROCHLOROTHIAZIDE 25 MG/1
25 TABLET ORAL DAILY
Qty: 30 TABLET | Refills: 0 | Status: SHIPPED | OUTPATIENT
Start: 2019-12-28 | End: 2020-01-02

## 2019-12-28 NOTE — ED PROVIDER NOTES
Patient Seen in: THE [facility] In [place]      History   Patient presents with:  Hypertension    Stated Complaint: \"my blood pressure was 165/119 at home\"; denies chest pain or SOB; pt c/o \"dull*    HPI    30-year-old female comes to Pulse 81   Temp 97.9 °F (36.6 °C) (Oral)   Resp 15   Ht 165.1 cm (5' 5\")   Wt 99.8 kg   SpO2 95%   BMI 36.61 kg/m²         Physical Exam    HEENT : NCAT, EOMI, PEERL, throat clear, neck supple, no JVD, trachea midline, No LAD  Heart: S1S2 normal. No murm was 165/119 at home; denies chest pain or SOB; pt c/o dull ache to left arm that started yesterday  PATIENT STATED HISTORY: (As transcribed by Technologist)  Patient with left arm dull aching since yesterday and elevated blood pressure today.  She denies an

## 2019-12-28 NOTE — ED INITIAL ASSESSMENT (HPI)
Patient states that BP was 165/119 at home. Denies chest pain or SOB. Has dull ache to left arm that started yesterday.

## 2019-12-29 LAB
ATRIAL RATE: 89 BPM
P AXIS: 67 DEGREES
P-R INTERVAL: 158 MS
Q-T INTERVAL: 380 MS
QRS DURATION: 94 MS
QTC CALCULATION (BEZET): 462 MS
R AXIS: 29 DEGREES
T AXIS: 83 DEGREES
VENTRICULAR RATE: 89 BPM

## 2020-01-02 ENCOUNTER — OFFICE VISIT (OUTPATIENT)
Dept: FAMILY MEDICINE CLINIC | Facility: CLINIC | Age: 70
End: 2020-01-02
Payer: COMMERCIAL

## 2020-01-02 VITALS
OXYGEN SATURATION: 98 % | WEIGHT: 231 LBS | HEART RATE: 82 BPM | BODY MASS INDEX: 38.49 KG/M2 | DIASTOLIC BLOOD PRESSURE: 76 MMHG | HEIGHT: 65 IN | SYSTOLIC BLOOD PRESSURE: 110 MMHG | RESPIRATION RATE: 18 BRPM

## 2020-01-02 DIAGNOSIS — I10 ESSENTIAL HYPERTENSION: Primary | ICD-10-CM

## 2020-01-02 PROBLEM — G04.90 ENCEPHALITIS (HCC): Status: RESOLVED | Noted: 2018-01-07 | Resolved: 2020-01-02

## 2020-01-02 PROBLEM — G04.90 ENCEPHALITIS: Status: RESOLVED | Noted: 2018-01-07 | Resolved: 2020-01-02

## 2020-01-02 PROCEDURE — 99213 OFFICE O/P EST LOW 20 MIN: CPT | Performed by: FAMILY MEDICINE

## 2020-01-02 RX ORDER — AMLODIPINE BESYLATE 5 MG/1
5 TABLET ORAL DAILY
Qty: 30 TABLET | Refills: 1 | Status: SHIPPED | OUTPATIENT
Start: 2020-01-02 | End: 2020-01-14

## 2020-01-02 NOTE — PATIENT INSTRUCTIONS
Calcium channel blocker may cause constipation or ankle swelling. If these things happen we can change to another family.

## 2020-01-02 NOTE — PROGRESS NOTES
This is a patient of Dr. Indira Elias I am meeting for the first time. She has been quite healthy. Noted a home blood pressure reading in 180. Rechecked it several times and got another high reading and then too high for the machine to calculate.   No headac Mother    • Other (nonalc cirrhosis) Brother    • Other (gout) Brother        PHYSICAL EXAM:  /76   Pulse 82   Resp 18   Ht 65\"   Wt 231 lb (104.8 kg)   SpO2 98%   BMI 38.44 kg/m²     Alert no acute distress. Neck without bruit. Lungs are clear.

## 2020-01-14 ENCOUNTER — OFFICE VISIT (OUTPATIENT)
Dept: FAMILY MEDICINE CLINIC | Facility: CLINIC | Age: 70
End: 2020-01-14
Payer: COMMERCIAL

## 2020-01-14 ENCOUNTER — LAB ENCOUNTER (OUTPATIENT)
Dept: LAB | Age: 70
End: 2020-01-14
Attending: FAMILY MEDICINE
Payer: COMMERCIAL

## 2020-01-14 VITALS
HEART RATE: 82 BPM | BODY MASS INDEX: 37.49 KG/M2 | DIASTOLIC BLOOD PRESSURE: 78 MMHG | WEIGHT: 225 LBS | OXYGEN SATURATION: 97 % | HEIGHT: 65 IN | SYSTOLIC BLOOD PRESSURE: 138 MMHG | RESPIRATION RATE: 16 BRPM

## 2020-01-14 DIAGNOSIS — I10 ESSENTIAL HYPERTENSION: ICD-10-CM

## 2020-01-14 DIAGNOSIS — E55.9 HYPOVITAMINOSIS D: ICD-10-CM

## 2020-01-14 DIAGNOSIS — Z12.31 ENCOUNTER FOR SCREENING MAMMOGRAM FOR MALIGNANT NEOPLASM OF BREAST: ICD-10-CM

## 2020-01-14 DIAGNOSIS — Z00.00 WELL WOMAN EXAM WITHOUT GYNECOLOGICAL EXAM: Primary | ICD-10-CM

## 2020-01-14 DIAGNOSIS — Z00.00 LABORATORY EXAM ORDERED AS PART OF ROUTINE GENERAL MEDICAL EXAMINATION: ICD-10-CM

## 2020-01-14 DIAGNOSIS — E89.0 POSTOPERATIVE HYPOTHYROIDISM: ICD-10-CM

## 2020-01-14 DIAGNOSIS — I65.29 OCCLUSION OF CAROTID ARTERY, UNSPECIFIED LATERALITY: ICD-10-CM

## 2020-01-14 DIAGNOSIS — I20.9 ANGINA PECTORIS (HCC): ICD-10-CM

## 2020-01-14 DIAGNOSIS — Z12.11 SCREEN FOR COLON CANCER: ICD-10-CM

## 2020-01-14 LAB
ALBUMIN SERPL-MCNC: 4 G/DL (ref 3.4–5)
ALBUMIN/GLOB SERPL: 1 {RATIO} (ref 1–2)
ALP LIVER SERPL-CCNC: 87 U/L (ref 55–142)
ALT SERPL-CCNC: 44 U/L (ref 13–56)
ANION GAP SERPL CALC-SCNC: 4 MMOL/L (ref 0–18)
AST SERPL-CCNC: 26 U/L (ref 15–37)
BASOPHILS # BLD AUTO: 0.03 X10(3) UL (ref 0–0.2)
BASOPHILS NFR BLD AUTO: 0.6 %
BILIRUB SERPL-MCNC: 2.1 MG/DL (ref 0.1–2)
BUN BLD-MCNC: 18 MG/DL (ref 7–18)
BUN/CREAT SERPL: 15 (ref 10–20)
CALCIUM BLD-MCNC: 9.4 MG/DL (ref 8.5–10.1)
CHLORIDE SERPL-SCNC: 107 MMOL/L (ref 98–112)
CHOLEST SMN-MCNC: 236 MG/DL (ref ?–200)
CO2 SERPL-SCNC: 30 MMOL/L (ref 21–32)
CREAT BLD-MCNC: 1.2 MG/DL (ref 0.55–1.02)
DEPRECATED RDW RBC AUTO: 43.3 FL (ref 35.1–46.3)
EOSINOPHIL # BLD AUTO: 0.12 X10(3) UL (ref 0–0.7)
EOSINOPHIL NFR BLD AUTO: 2.3 %
ERYTHROCYTE [DISTWIDTH] IN BLOOD BY AUTOMATED COUNT: 13.6 % (ref 11–15)
GLOBULIN PLAS-MCNC: 4 G/DL (ref 2.8–4.4)
GLUCOSE BLD-MCNC: 103 MG/DL (ref 70–99)
HCT VFR BLD AUTO: 50.5 % (ref 35–48)
HDLC SERPL-MCNC: 51 MG/DL (ref 40–59)
HGB BLD-MCNC: 16.1 G/DL (ref 12–16)
IMM GRANULOCYTES # BLD AUTO: 0.01 X10(3) UL (ref 0–1)
IMM GRANULOCYTES NFR BLD: 0.2 %
LDLC SERPL CALC-MCNC: 166 MG/DL (ref ?–100)
LYMPHOCYTES # BLD AUTO: 1.78 X10(3) UL (ref 1–4)
LYMPHOCYTES NFR BLD AUTO: 34.4 %
M PROTEIN MFR SERPL ELPH: 8 G/DL (ref 6.4–8.2)
MCH RBC QN AUTO: 28 PG (ref 26–34)
MCHC RBC AUTO-ENTMCNC: 31.9 G/DL (ref 31–37)
MCV RBC AUTO: 88 FL (ref 80–100)
MONOCYTES # BLD AUTO: 0.49 X10(3) UL (ref 0.1–1)
MONOCYTES NFR BLD AUTO: 9.5 %
NEUTROPHILS # BLD AUTO: 2.74 X10 (3) UL (ref 1.5–7.7)
NEUTROPHILS # BLD AUTO: 2.74 X10(3) UL (ref 1.5–7.7)
NEUTROPHILS NFR BLD AUTO: 53 %
NONHDLC SERPL-MCNC: 185 MG/DL (ref ?–130)
OSMOLALITY SERPL CALC.SUM OF ELEC: 294 MOSM/KG (ref 275–295)
PATIENT FASTING Y/N/NP: YES
PATIENT FASTING Y/N/NP: YES
PLATELET # BLD AUTO: 219 10(3)UL (ref 150–450)
POTASSIUM SERPL-SCNC: 4.2 MMOL/L (ref 3.5–5.1)
RBC # BLD AUTO: 5.74 X10(6)UL (ref 3.8–5.3)
SODIUM SERPL-SCNC: 141 MMOL/L (ref 136–145)
TRIGL SERPL-MCNC: 93 MG/DL (ref 30–149)
TSI SER-ACNC: 0.96 MIU/ML (ref 0.36–3.74)
VIT D+METAB SERPL-MCNC: 76.7 NG/ML (ref 30–100)
VLDLC SERPL CALC-MCNC: 19 MG/DL (ref 0–30)
WBC # BLD AUTO: 5.2 X10(3) UL (ref 4–11)

## 2020-01-14 PROCEDURE — 85025 COMPLETE CBC W/AUTO DIFF WBC: CPT

## 2020-01-14 PROCEDURE — 80061 LIPID PANEL: CPT

## 2020-01-14 PROCEDURE — 80053 COMPREHEN METABOLIC PANEL: CPT

## 2020-01-14 PROCEDURE — 82306 VITAMIN D 25 HYDROXY: CPT

## 2020-01-14 PROCEDURE — 90750 HZV VACC RECOMBINANT IM: CPT | Performed by: FAMILY MEDICINE

## 2020-01-14 PROCEDURE — 99397 PER PM REEVAL EST PAT 65+ YR: CPT | Performed by: FAMILY MEDICINE

## 2020-01-14 PROCEDURE — 90471 IMMUNIZATION ADMIN: CPT | Performed by: FAMILY MEDICINE

## 2020-01-14 PROCEDURE — 84443 ASSAY THYROID STIM HORMONE: CPT

## 2020-01-14 PROCEDURE — 36415 COLL VENOUS BLD VENIPUNCTURE: CPT

## 2020-01-14 RX ORDER — AMLODIPINE BESYLATE 5 MG/1
5 TABLET ORAL DAILY
Qty: 90 TABLET | Refills: 3 | Status: SHIPPED | OUTPATIENT
Start: 2020-01-14 | End: 2020-03-16

## 2020-01-14 NOTE — PROGRESS NOTES
Patient is here for a wellness care.   Works for [employer].  Non-smoker modest drinker was recently well until she developed some atypical left upper extremity pain and subsequently had that and her blood pressure monitored where it was discovered maricarmen

## 2020-01-17 RX ORDER — LEVOTHYROXINE SODIUM 88 UG/1
TABLET ORAL
Qty: 90 TABLET | Refills: 3 | Status: SHIPPED | OUTPATIENT
Start: 2020-01-17 | End: 2020-03-16

## 2020-02-01 ENCOUNTER — HOSPITAL ENCOUNTER (OUTPATIENT)
Dept: CV DIAGNOSTICS | Facility: HOSPITAL | Age: 70
Discharge: HOME | End: 2020-02-01
Attending: FAMILY MEDICINE
Payer: COMMERCIAL

## 2020-02-01 DIAGNOSIS — I20.9 ANGINA PECTORIS (HCC): ICD-10-CM

## 2020-02-01 PROCEDURE — 93017 CV STRESS TEST TRACING ONLY: CPT | Performed by: FAMILY MEDICINE

## 2020-02-01 PROCEDURE — 93018 CV STRESS TEST I&R ONLY: CPT | Performed by: FAMILY MEDICINE

## 2020-02-01 PROCEDURE — 93350 STRESS TTE ONLY: CPT | Performed by: FAMILY MEDICINE

## 2020-02-08 ENCOUNTER — HOSPITAL ENCOUNTER (OUTPATIENT)
Dept: MAMMOGRAPHY | Age: 70
Discharge: HOME | End: 2020-02-08
Attending: FAMILY MEDICINE
Payer: COMMERCIAL

## 2020-02-08 ENCOUNTER — HOSPITAL ENCOUNTER (OUTPATIENT)
Dept: ULTRASOUND IMAGING | Age: 70
Discharge: HOME | End: 2020-02-08
Attending: FAMILY MEDICINE
Payer: COMMERCIAL

## 2020-02-08 ENCOUNTER — HOSPITAL ENCOUNTER (OUTPATIENT)
Dept: ULTRASOUND IMAGING | Age: 70
Discharge: HOME | End: 2020-02-08
Attending: OTOLARYNGOLOGY
Payer: COMMERCIAL

## 2020-02-08 DIAGNOSIS — I65.29 OCCLUSION OF CAROTID ARTERY, UNSPECIFIED LATERALITY: ICD-10-CM

## 2020-02-08 DIAGNOSIS — E07.9 DISORDER OF THYROID GLAND: ICD-10-CM

## 2020-02-08 DIAGNOSIS — Z12.31 ENCOUNTER FOR SCREENING MAMMOGRAM FOR MALIGNANT NEOPLASM OF BREAST: ICD-10-CM

## 2020-02-08 DIAGNOSIS — I20.9 ANGINA PECTORIS (HCC): ICD-10-CM

## 2020-02-08 PROCEDURE — 77067 SCR MAMMO BI INCL CAD: CPT | Performed by: FAMILY MEDICINE

## 2020-02-08 PROCEDURE — 93880 EXTRACRANIAL BILAT STUDY: CPT | Performed by: FAMILY MEDICINE

## 2020-02-08 PROCEDURE — 77063 BREAST TOMOSYNTHESIS BI: CPT | Performed by: FAMILY MEDICINE

## 2020-02-08 PROCEDURE — 76536 US EXAM OF HEAD AND NECK: CPT | Performed by: OTOLARYNGOLOGY

## 2020-02-11 NOTE — PROGRESS NOTES
Zoe Gupta, your thyroid ultrasound is showing stable findings to thyroid nodule. It is recommended you follow up with Dr Harley Suero to further discuss and evaluate with Dr Harley Suero. Please call the office to schedule that follow up.

## 2020-03-06 ENCOUNTER — TELEPHONE (OUTPATIENT)
Dept: FAMILY MEDICINE CLINIC | Facility: CLINIC | Age: 70
End: 2020-03-06

## 2020-03-13 ENCOUNTER — NURSE ONLY (OUTPATIENT)
Dept: FAMILY MEDICINE CLINIC | Facility: CLINIC | Age: 70
End: 2020-03-13
Payer: COMMERCIAL

## 2020-03-13 PROCEDURE — 90471 IMMUNIZATION ADMIN: CPT | Performed by: FAMILY MEDICINE

## 2020-03-13 PROCEDURE — 90750 HZV VACC RECOMBINANT IM: CPT | Performed by: FAMILY MEDICINE

## 2020-03-16 ENCOUNTER — OFFICE VISIT (OUTPATIENT)
Dept: FAMILY MEDICINE CLINIC | Facility: CLINIC | Age: 70
End: 2020-03-16
Payer: COMMERCIAL

## 2020-03-16 VITALS
DIASTOLIC BLOOD PRESSURE: 80 MMHG | HEART RATE: 64 BPM | WEIGHT: 209 LBS | BODY MASS INDEX: 34.82 KG/M2 | HEIGHT: 65 IN | TEMPERATURE: 99 F | RESPIRATION RATE: 16 BRPM | SYSTOLIC BLOOD PRESSURE: 126 MMHG

## 2020-03-16 DIAGNOSIS — I10 ESSENTIAL HYPERTENSION: ICD-10-CM

## 2020-03-16 DIAGNOSIS — E89.0 POSTOPERATIVE HYPOTHYROIDISM: Primary | ICD-10-CM

## 2020-03-16 PROCEDURE — 99214 OFFICE O/P EST MOD 30 MIN: CPT | Performed by: FAMILY MEDICINE

## 2020-03-16 RX ORDER — AMLODIPINE BESYLATE 2.5 MG/1
2.5 TABLET ORAL DAILY
Qty: 90 TABLET | Refills: 0 | Status: SHIPPED | OUTPATIENT
Start: 2020-03-16 | End: 2020-06-07

## 2020-03-16 RX ORDER — LEVOTHYROXINE SODIUM 88 UG/1
TABLET ORAL
Qty: 90 TABLET | Refills: 2 | Status: SHIPPED | OUTPATIENT
Start: 2020-03-16 | End: 2021-03-16

## 2020-03-16 NOTE — PROGRESS NOTES
Daniel Garcia is a 72year old female. Patient presents with:  Establish Care      HPI:   At the end of December had a pain in her arm for 2 days. Checked BP at home and it was 974 systolic. It was going up and down. She went to ER.  She was given BP kg)  02/23/19 : 223 lb 6.4 oz (101.3 kg)      REVIEW OF SYSTEMS:   GENERAL HEALTH: feels well no complaints   SKIN: denies any unusual skin lesions or rashes  RESPIRATORY: denies shortness of breath with exertion  CARDIOVASCULAR: denies chest pain on exert

## 2020-06-07 DIAGNOSIS — I10 ESSENTIAL HYPERTENSION: ICD-10-CM

## 2020-06-07 RX ORDER — AMLODIPINE BESYLATE 2.5 MG/1
TABLET ORAL
Qty: 90 TABLET | Refills: 0 | Status: SHIPPED | OUTPATIENT
Start: 2020-06-07 | End: 2021-02-01 | Stop reason: DRUGHIGH

## 2020-09-11 ENCOUNTER — TELEPHONE (OUTPATIENT)
Dept: FAMILY MEDICINE CLINIC | Facility: CLINIC | Age: 70
End: 2020-09-11

## 2020-09-11 NOTE — TELEPHONE ENCOUNTER
Patient notified and verbalized understanding. Patient expressed her thanks to KE.   Also states she feels better since her weightloss  States cholesterol went down 30 points

## 2020-09-11 NOTE — TELEPHONE ENCOUNTER
Patient has been doing weight watchers since January and now weighs 185 pounds. She weighs in with weight watchers so is not looking for KE to confirm her weight.   States weight watchers has set standards for healthy weight and patient is not within their

## 2020-09-11 NOTE — TELEPHONE ENCOUNTER
Jonatan Mendoza is asking for a note to State\" Her current weight is 185 and this is a healthy weight This is for weight watchers   Any questions please call Please put note on MY chart

## 2020-10-27 ENCOUNTER — OFFICE VISIT (OUTPATIENT)
Dept: FAMILY MEDICINE CLINIC | Facility: CLINIC | Age: 70
End: 2020-10-27
Payer: COMMERCIAL

## 2020-10-27 ENCOUNTER — HOSPITAL ENCOUNTER (OUTPATIENT)
Dept: GENERAL RADIOLOGY | Age: 70
Discharge: HOME | End: 2020-10-27
Attending: FAMILY MEDICINE
Payer: COMMERCIAL

## 2020-10-27 VITALS
WEIGHT: 184 LBS | HEART RATE: 64 BPM | RESPIRATION RATE: 16 BRPM | HEIGHT: 65 IN | SYSTOLIC BLOOD PRESSURE: 114 MMHG | BODY MASS INDEX: 30.66 KG/M2 | TEMPERATURE: 99 F | DIASTOLIC BLOOD PRESSURE: 80 MMHG

## 2020-10-27 DIAGNOSIS — G89.29 CHRONIC PAIN OF RIGHT KNEE: ICD-10-CM

## 2020-10-27 DIAGNOSIS — M25.561 CHRONIC PAIN OF RIGHT KNEE: ICD-10-CM

## 2020-10-27 DIAGNOSIS — G89.29 CHRONIC PAIN OF RIGHT KNEE: Primary | ICD-10-CM

## 2020-10-27 DIAGNOSIS — M25.561 CHRONIC PAIN OF RIGHT KNEE: Primary | ICD-10-CM

## 2020-10-27 PROCEDURE — 99214 OFFICE O/P EST MOD 30 MIN: CPT | Performed by: FAMILY MEDICINE

## 2020-10-27 PROCEDURE — 73560 X-RAY EXAM OF KNEE 1 OR 2: CPT | Performed by: FAMILY MEDICINE

## 2020-10-27 PROCEDURE — 3079F DIAST BP 80-89 MM HG: CPT | Performed by: FAMILY MEDICINE

## 2020-10-27 PROCEDURE — 3008F BODY MASS INDEX DOCD: CPT | Performed by: FAMILY MEDICINE

## 2020-10-27 PROCEDURE — 3074F SYST BP LT 130 MM HG: CPT | Performed by: FAMILY MEDICINE

## 2020-10-27 NOTE — PROGRESS NOTES
Beatrice Frazier is a 72year old female. Patient presents with:  Knee Pain: pain in knee after being on the floor from August      HPI:   She was playing on the floor with her grandson in August. She was getting up and felt something pull or tear.  It d Encounters:  10/27/20 : 114/80  03/16/20 : 126/80  01/14/20 : 138/78  01/02/20 : 110/76  12/28/19 : 135/75  04/18/19 : 133/89      Wt Readings from Last 6 Encounters:  10/27/20 : 184 lb (83.5 kg)  03/16/20 : 209 lb (94.8 kg)  01/14/20 : 225 lb (102.1 kg) encounter             The patient indicates understanding of these issues and agrees to the plan.

## 2020-10-28 ENCOUNTER — TELEPHONE (OUTPATIENT)
Dept: FAMILY MEDICINE CLINIC | Facility: CLINIC | Age: 70
End: 2020-10-28

## 2020-10-28 DIAGNOSIS — S83.91XA SPRAIN OF RIGHT KNEE, UNSPECIFIED LIGAMENT, INITIAL ENCOUNTER: ICD-10-CM

## 2020-10-28 DIAGNOSIS — G89.29 CHRONIC PAIN OF RIGHT KNEE: Primary | ICD-10-CM

## 2020-10-28 DIAGNOSIS — M25.561 CHRONIC PAIN OF RIGHT KNEE: Primary | ICD-10-CM

## 2020-10-28 NOTE — TELEPHONE ENCOUNTER
PATIENT IS ADVANCING IN CARE PLAN. VITAL SIGNS STABLE WITH PATIENT HAVING NO
COMPLAINTS OF PAIN OR NAUSEA. PATIENT IS FULLY ORIENTED AND WAS ABLE TO
FULLY PARTICIPATE IN ADMISSION PROCESS. [facility] PROVIDED Q4 HOURS PER PROTOCOL
WITH PATIENT SCORING ZERO. UP MULTIPLE TIMES TO BATHROOM WITH ASSISTANCE
INCIDENT FREE. PATIENT IS SCHEDULED FOR ECHOCARDIOGRAM TODAY AND IS ANXIOUS
FOR DISCHARGE HOPEFULLY SOON. CONTINUE PLAN OF CARE. See results note. PT ordered for chronic knee pain.

## 2020-12-20 ENCOUNTER — IMMUNIZATION (OUTPATIENT)
Dept: LAB | Facility: HOSPITAL | Age: 70
End: 2020-12-20
Attending: PREVENTIVE MEDICINE

## 2020-12-20 DIAGNOSIS — Z23 NEED FOR VACCINATION: ICD-10-CM

## 2020-12-20 PROCEDURE — 0001A PFIZER-BIONTECH COVID-19 VACCINE: CPT

## 2021-01-10 ENCOUNTER — IMMUNIZATION (OUTPATIENT)
Dept: LAB | Facility: HOSPITAL | Age: 71
End: 2021-01-10
Attending: PREVENTIVE MEDICINE
Payer: MEDICARE

## 2021-01-10 DIAGNOSIS — Z23 NEED FOR VACCINATION: ICD-10-CM

## 2021-01-10 PROCEDURE — 0002A SARSCOV2 VAC 30MCG/0.3ML IM: CPT

## 2021-02-01 ENCOUNTER — OFFICE VISIT (OUTPATIENT)
Dept: FAMILY MEDICINE CLINIC | Facility: CLINIC | Age: 71
End: 2021-02-01
Payer: MEDICARE

## 2021-02-01 VITALS
RESPIRATION RATE: 16 BRPM | WEIGHT: 184 LBS | BODY MASS INDEX: 30.66 KG/M2 | OXYGEN SATURATION: 99 % | DIASTOLIC BLOOD PRESSURE: 80 MMHG | HEART RATE: 72 BPM | HEIGHT: 65 IN | SYSTOLIC BLOOD PRESSURE: 110 MMHG | TEMPERATURE: 98 F

## 2021-02-01 DIAGNOSIS — I10 ESSENTIAL HYPERTENSION: ICD-10-CM

## 2021-02-01 DIAGNOSIS — Z12.31 ENCOUNTER FOR SCREENING MAMMOGRAM FOR MALIGNANT NEOPLASM OF BREAST: ICD-10-CM

## 2021-02-01 DIAGNOSIS — Z00.00 ENCOUNTER FOR ANNUAL HEALTH EXAMINATION: Primary | ICD-10-CM

## 2021-02-01 DIAGNOSIS — E87.6 HYPOKALEMIA: ICD-10-CM

## 2021-02-01 DIAGNOSIS — E66.09 CLASS 1 OBESITY DUE TO EXCESS CALORIES WITHOUT SERIOUS COMORBIDITY WITH BODY MASS INDEX (BMI) OF 30.0 TO 30.9 IN ADULT: ICD-10-CM

## 2021-02-01 DIAGNOSIS — Z80.0 FAMILY HISTORY OF COLON CANCER: ICD-10-CM

## 2021-02-01 DIAGNOSIS — E78.5 HYPERLIPIDEMIA, UNSPECIFIED HYPERLIPIDEMIA TYPE: ICD-10-CM

## 2021-02-01 DIAGNOSIS — E89.0 POSTOPERATIVE HYPOTHYROIDISM: ICD-10-CM

## 2021-02-01 PROCEDURE — 96160 PT-FOCUSED HLTH RISK ASSMT: CPT | Performed by: FAMILY MEDICINE

## 2021-02-01 PROCEDURE — 3008F BODY MASS INDEX DOCD: CPT | Performed by: FAMILY MEDICINE

## 2021-02-01 PROCEDURE — G0439 PPPS, SUBSEQ VISIT: HCPCS | Performed by: FAMILY MEDICINE

## 2021-02-01 PROCEDURE — 3079F DIAST BP 80-89 MM HG: CPT | Performed by: FAMILY MEDICINE

## 2021-02-01 PROCEDURE — 99397 PER PM REEVAL EST PAT 65+ YR: CPT | Performed by: FAMILY MEDICINE

## 2021-02-01 PROCEDURE — 3074F SYST BP LT 130 MM HG: CPT | Performed by: FAMILY MEDICINE

## 2021-02-01 RX ORDER — AMLODIPINE BESYLATE 5 MG/1
5 TABLET ORAL DAILY
COMMUNITY
Start: 2020-10-23 | End: 2021-02-02

## 2021-02-01 NOTE — PATIENT INSTRUCTIONS
Patti Hart's SCREENING SCHEDULE   Tests on this list are recommended by your physician but may not be covered, or covered at this frequency, by your insurer. Please check with your insurance carrier before scheduling to verify coverage.    PREVENT following criteria:   • Men who are 73-68 years old and have smoked more than 100 cigarettes in their lifetime   • Anyone with a family history    Colorectal Cancer Screening  Covered up to Age 76     Colonoscopy Screen   Covered every 10 years- more often orders found for this or any previous visit.  Please get every year    Pneumococcal 13 (Prevnar)  Covered Once after 65 Orders placed or performed in visit on 10/03/16   • PNEUMOCOCCAL VACC, 13 LUZ IM    Please get once after your 65th birthday    Sharonda Leong

## 2021-02-01 NOTE — PROGRESS NOTES
HPI:   Deidre Laurent is a 80year old female who presents for a Medicare Subsequent Annual Wellness visit (Pt already had Initial Annual Wellness). Went to ENT for audiology eval.      Toenails come off, they are regrowing.       Has lost 50 lbs in Medicine)  Chelsea Lynch MD as Consulting Physician (NEUROLOGY)    Patient Active Problem List:     Postoperative hypothyroidism     Family history of colon cancer     Hypokalemia     Hyperlipidemia     Obesity, unspecified    Wt Readings from Last 3 En thyroid cancer in her brother. SOCIAL HISTORY:   She  reports that she has never smoked. She has never used smokeless tobacco. She reports that she does not drink alcohol or use drugs.      REVIEW OF SYSTEMS:   GENERAL: feels well otherwise  SKIN: denies sinus tenderness   Throat: Lips, mucosa, and tongue normal; teeth and gums normal   Neck: Supple, symmetrical, trachea midline, no adenopathy;  thyroid: not enlarged, symmetric, no tenderness/mass/nodules;    Back:   Symmetric, no curvature, ROM normal, DIFFERENTIAL WITH PLATELET; Future  -     COMP METABOLIC PANEL (14); Future  -     LIPID PANEL; Future    Hypokalemia  - recheck potassium. Family history of colon cancer  - up to date with colonoscopy. - due next in 2014.      Class 1 obesity due to Flex Sigmoidoscopy Screen every 10 years No results found for this or any previous visit. No flowsheet data found. Fecal Occult Blood Annually No results found for: FOB No flowsheet data found.     Glaucoma Screening      Ophthalmology Visit Annually: D prescription benefits                        Template: JAMISON HOLM MEDICARE ANNUAL ASSESSMENT FEMALE [47382]

## 2021-02-02 DIAGNOSIS — I10 ESSENTIAL HYPERTENSION: Primary | ICD-10-CM

## 2021-02-02 RX ORDER — AMLODIPINE BESYLATE 5 MG/1
5 TABLET ORAL DAILY
Qty: 90 TABLET | Refills: 3 | Status: SHIPPED | OUTPATIENT
Start: 2021-02-02 | End: 2021-12-03

## 2021-02-02 NOTE — TELEPHONE ENCOUNTER
Last OV 2/1/21  Last labs 1/14/2020  Last refilled for amlodipine 2.5 mg. Listed as historical for Amlodipine 5 mg    Spoke with patient who confirmed she is taking 5 mg daily  Would like sent to [address] [place] [address] [address] order    Routed to  to advise

## 2021-02-02 NOTE — TELEPHONE ENCOUNTER
She was asking about amLODIPine Besylate 5 MG Oral   She was denied . but it was under her  insurance number   Please send this script again.

## 2021-02-05 LAB
ABSOLUTE BASOPHILS: 31 CELLS/UL (ref 0–200)
ABSOLUTE EOSINOPHILS: 122 CELLS/UL (ref 15–500)
ABSOLUTE LYMPHOCYTES: 1913 CELLS/UL (ref 850–3900)
ABSOLUTE MONOCYTES: 485 CELLS/UL (ref 200–950)
ABSOLUTE NEUTROPHILS: 2550 CELLS/UL (ref 1500–7800)
ALBUMIN/GLOBULIN RATIO: 1.5 (CALC) (ref 1–2.5)
ALBUMIN: 4.1 G/DL (ref 3.6–5.1)
ALKALINE PHOSPHATASE: 88 U/L (ref 37–153)
ALT: 14 U/L (ref 6–29)
AST: 16 U/L (ref 10–35)
BASOPHILS: 0.6 %
BILIRUBIN, TOTAL: 1.7 MG/DL (ref 0.2–1.2)
BUN/CREATININE RATIO: 19 (CALC) (ref 6–22)
BUN: 19 MG/DL (ref 7–25)
CALCIUM: 9.3 MG/DL (ref 8.6–10.4)
CARBON DIOXIDE: 32 MMOL/L (ref 20–32)
CHLORIDE: 102 MMOL/L (ref 98–110)
CHOL/HDLC RATIO: 3.8 (CALC)
CHOLESTEROL, TOTAL: 217 MG/DL
CREATININE: 0.98 MG/DL (ref 0.6–0.93)
EGFR IF AFRICN AM: 68 ML/MIN/1.73M2
EGFR IF NONAFRICN AM: 58 ML/MIN/1.73M2
EOSINOPHILS: 2.4 %
GLOBULIN: 2.7 G/DL (CALC) (ref 1.9–3.7)
GLUCOSE: 81 MG/DL (ref 65–99)
HDL CHOLESTEROL: 57 MG/DL
HEMATOCRIT: 46.2 % (ref 35–45)
HEMOGLOBIN: 14.9 G/DL (ref 11.7–15.5)
LDL-CHOLESTEROL: 141 MG/DL (CALC)
LYMPHOCYTES: 37.5 %
MCH: 28 PG (ref 27–33)
MCHC: 32.3 G/DL (ref 32–36)
MCV: 86.8 FL (ref 80–100)
MONOCYTES: 9.5 %
MPV: 11.5 FL (ref 7.5–12.5)
NEUTROPHILS: 50 %
NON-HDL CHOLESTEROL: 160 MG/DL (CALC)
PLATELET COUNT: 217 THOUSAND/UL (ref 140–400)
POTASSIUM: 4.1 MMOL/L (ref 3.5–5.3)
PROTEIN, TOTAL: 6.8 G/DL (ref 6.1–8.1)
RDW: 12.4 % (ref 11–15)
RED BLOOD CELL COUNT: 5.32 MILLION/UL (ref 3.8–5.1)
SODIUM: 141 MMOL/L (ref 135–146)
TRIGLYCERIDES: 82 MG/DL
WHITE BLOOD CELL COUNT: 5.1 THOUSAND/UL (ref 3.8–10.8)

## 2021-02-17 ENCOUNTER — HOSPITAL ENCOUNTER (OUTPATIENT)
Dept: ULTRASOUND IMAGING | Age: 71
Discharge: HOME | End: 2021-02-17
Attending: OTOLARYNGOLOGY
Payer: MEDICARE

## 2021-02-17 ENCOUNTER — HOSPITAL ENCOUNTER (OUTPATIENT)
Dept: MAMMOGRAPHY | Age: 71
Discharge: HOME | End: 2021-02-17
Attending: FAMILY MEDICINE
Payer: MEDICARE

## 2021-02-17 DIAGNOSIS — E07.9 THYROID DYSFUNCTION: ICD-10-CM

## 2021-02-17 DIAGNOSIS — E04.1 THYROID NODULE: ICD-10-CM

## 2021-02-17 DIAGNOSIS — Z12.31 ENCOUNTER FOR SCREENING MAMMOGRAM FOR MALIGNANT NEOPLASM OF BREAST: ICD-10-CM

## 2021-02-17 PROCEDURE — 76536 US EXAM OF HEAD AND NECK: CPT | Performed by: OTOLARYNGOLOGY

## 2021-02-17 PROCEDURE — 77063 BREAST TOMOSYNTHESIS BI: CPT | Performed by: FAMILY MEDICINE

## 2021-02-17 PROCEDURE — 77067 SCR MAMMO BI INCL CAD: CPT | Performed by: FAMILY MEDICINE

## 2021-09-24 ENCOUNTER — TELEPHONE (OUTPATIENT)
Dept: FAMILY MEDICINE CLINIC | Facility: CLINIC | Age: 71
End: 2021-09-24

## 2021-09-24 DIAGNOSIS — J01.90 ACUTE NON-RECURRENT SINUSITIS, UNSPECIFIED LOCATION: Primary | ICD-10-CM

## 2021-09-24 RX ORDER — AMOXICILLIN AND CLAVULANATE POTASSIUM 875; 125 MG/1; MG/1
1 TABLET, FILM COATED ORAL 2 TIMES DAILY
Qty: 20 TABLET | Refills: 0 | Status: SHIPPED | OUTPATIENT
Start: 2021-09-24 | End: 2021-10-04

## 2021-09-24 NOTE — TELEPHONE ENCOUNTER
Pt called stating she thinks she has a sinus infection. no fever, or other symptoms. She is out of town, in [place] visiting daughter.       Please advise- thank you

## 2021-09-24 NOTE — TELEPHONE ENCOUNTER
If there is any concern for COVID, she needs to get tested right away given her age. She would qualify for an antibody treatment if she was positive.      I sent in an antibiotic, but if she is not getting better, she needs to be seen somewhere down there t

## 2021-09-24 NOTE — TELEPHONE ENCOUNTER
Patient notified and verbalized understanding. States she has no concern for covid. Has not been going anywhere. Understands if not improving or starts to worsen needs to be evaluated in [place].

## 2021-09-24 NOTE — TELEPHONE ENCOUNTER
Patient states symptoms started about 7 days ago. States she is in [place] visiting her daughter and there are different allergens she has been reacting to (trees and mold)  States she has been taking allegra and robitussin.  She now has PND which is causin

## 2021-10-05 ENCOUNTER — TELEPHONE (OUTPATIENT)
Dept: FAMILY MEDICINE CLINIC | Facility: CLINIC | Age: 71
End: 2021-10-05

## 2021-12-03 DIAGNOSIS — I10 ESSENTIAL HYPERTENSION: ICD-10-CM

## 2021-12-03 DIAGNOSIS — E89.0 POSTOPERATIVE HYPOTHYROIDISM: ICD-10-CM

## 2021-12-03 RX ORDER — LEVOTHYROXINE SODIUM 88 UG/1
TABLET ORAL
Qty: 90 TABLET | Refills: 3 | Status: SHIPPED | OUTPATIENT
Start: 2021-12-03

## 2021-12-03 RX ORDER — AMLODIPINE BESYLATE 5 MG/1
TABLET ORAL
Qty: 90 TABLET | Refills: 3 | Status: SHIPPED | OUTPATIENT
Start: 2021-12-03

## 2021-12-03 NOTE — TELEPHONE ENCOUNTER
Hypertension Medications Protocol Passed 12/03/2021 12:34 PM   Protocol Details  CMP or BMP in past 12 months    Last serum creatinine< 2.0    Appointment in past 6 or next 3 months        amLODIPine Besylate 5 MG Oral Tab  Last refilled on 2/2/21 #90 wit

## 2022-01-21 ENCOUNTER — HOSPITAL ENCOUNTER (OUTPATIENT)
Dept: CT IMAGING | Age: 72
Discharge: HOME | End: 2022-01-21
Attending: FAMILY MEDICINE

## 2022-01-21 DIAGNOSIS — Z13.9 SPECIAL SCREENING: ICD-10-CM

## 2022-02-01 ENCOUNTER — OFFICE VISIT (OUTPATIENT)
Dept: FAMILY MEDICINE CLINIC | Facility: CLINIC | Age: 72
End: 2022-02-01
Payer: MEDICARE

## 2022-02-01 VITALS
RESPIRATION RATE: 16 BRPM | DIASTOLIC BLOOD PRESSURE: 74 MMHG | WEIGHT: 187 LBS | HEART RATE: 68 BPM | SYSTOLIC BLOOD PRESSURE: 110 MMHG | TEMPERATURE: 97 F | BODY MASS INDEX: 31.92 KG/M2 | HEIGHT: 64 IN | OXYGEN SATURATION: 95 %

## 2022-02-01 DIAGNOSIS — E78.5 HYPERLIPIDEMIA, UNSPECIFIED HYPERLIPIDEMIA TYPE: ICD-10-CM

## 2022-02-01 DIAGNOSIS — E89.0 POSTOPERATIVE HYPOTHYROIDISM: ICD-10-CM

## 2022-02-01 DIAGNOSIS — Z00.00 ENCOUNTER FOR ANNUAL HEALTH EXAMINATION: Primary | ICD-10-CM

## 2022-02-01 DIAGNOSIS — E87.6 HYPOKALEMIA: ICD-10-CM

## 2022-02-01 DIAGNOSIS — Z80.0 FAMILY HISTORY OF COLON CANCER: ICD-10-CM

## 2022-02-01 DIAGNOSIS — E66.09 CLASS 1 OBESITY DUE TO EXCESS CALORIES WITHOUT SERIOUS COMORBIDITY WITH BODY MASS INDEX (BMI) OF 30.0 TO 30.9 IN ADULT: ICD-10-CM

## 2022-02-01 DIAGNOSIS — Z12.31 ENCOUNTER FOR SCREENING MAMMOGRAM FOR MALIGNANT NEOPLASM OF BREAST: ICD-10-CM

## 2022-02-01 PROBLEM — R00.2 PALPITATIONS: Status: ACTIVE | Noted: 2022-02-01

## 2022-02-01 PROCEDURE — 3078F DIAST BP <80 MM HG: CPT | Performed by: FAMILY MEDICINE

## 2022-02-01 PROCEDURE — G0439 PPPS, SUBSEQ VISIT: HCPCS | Performed by: FAMILY MEDICINE

## 2022-02-01 PROCEDURE — 3008F BODY MASS INDEX DOCD: CPT | Performed by: FAMILY MEDICINE

## 2022-02-01 PROCEDURE — 3074F SYST BP LT 130 MM HG: CPT | Performed by: FAMILY MEDICINE

## 2022-02-01 PROCEDURE — 96160 PT-FOCUSED HLTH RISK ASSMT: CPT | Performed by: FAMILY MEDICINE

## 2022-02-01 PROCEDURE — 99397 PER PM REEVAL EST PAT 65+ YR: CPT | Performed by: FAMILY MEDICINE

## 2022-02-08 ENCOUNTER — HOSPITAL ENCOUNTER (OUTPATIENT)
Dept: ULTRASOUND IMAGING | Age: 72
Discharge: HOME | End: 2022-02-08
Attending: PHYSICIAN ASSISTANT
Payer: MEDICARE

## 2022-02-08 DIAGNOSIS — E04.1 THYROID NODULE: ICD-10-CM

## 2022-02-08 PROCEDURE — 76536 US EXAM OF HEAD AND NECK: CPT | Performed by: PHYSICIAN ASSISTANT

## 2022-02-10 LAB
ABSOLUTE BASOPHILS: 21 CELLS/UL (ref 0–200)
ABSOLUTE EOSINOPHILS: 98 CELLS/UL (ref 15–500)
ABSOLUTE LYMPHOCYTES: 1759 CELLS/UL (ref 850–3900)
ABSOLUTE MONOCYTES: 340 CELLS/UL (ref 200–950)
ABSOLUTE NEUTROPHILS: 1882 CELLS/UL (ref 1500–7800)
ALBUMIN/GLOBULIN RATIO: 2 (CALC) (ref 1–2.5)
ALBUMIN: 4.3 G/DL (ref 3.6–5.1)
ALKALINE PHOSPHATASE: 79 U/L (ref 37–153)
ALT: 12 U/L (ref 6–29)
AST: 15 U/L (ref 10–35)
BASOPHILS: 0.5 %
BILIRUBIN, TOTAL: 1.5 MG/DL (ref 0.2–1.2)
CALCIUM: 9.5 MG/DL (ref 8.6–10.4)
CARBON DIOXIDE: 30 MMOL/L (ref 20–32)
CHLORIDE: 104 MMOL/L (ref 98–110)
CHOL/HDLC RATIO: 4.3 (CALC)
CHOLESTEROL, TOTAL: 233 MG/DL
CREATININE: 0.84 MG/DL (ref 0.6–0.93)
EGFR IF AFRICN AM: 81 ML/MIN/1.73M2
EGFR IF NONAFRICN AM: 70 ML/MIN/1.73M2
EOSINOPHILS: 2.4 %
GLUCOSE: 78 MG/DL (ref 65–99)
HDL CHOLESTEROL: 54 MG/DL
HEMATOCRIT: 44.8 % (ref 35–45)
LDL-CHOLESTEROL: 160 MG/DL (CALC)
LYMPHOCYTES: 42.9 %
MCH: 28.7 PG (ref 27–33)
MCHC: 33.5 G/DL (ref 32–36)
MCV: 85.8 FL (ref 80–100)
MONOCYTES: 8.3 %
MPV: 11.1 FL (ref 7.5–12.5)
NEUTROPHILS: 45.9 %
NON-HDL CHOLESTEROL: 179 MG/DL (CALC)
PLATELET COUNT: 199 THOUSAND/UL (ref 140–400)
POTASSIUM: 3.9 MMOL/L (ref 3.5–5.3)
PROTEIN, TOTAL: 6.5 G/DL (ref 6.1–8.1)
RDW: 12.8 % (ref 11–15)
RED BLOOD CELL COUNT: 5.22 MILLION/UL (ref 3.8–5.1)
SODIUM: 141 MMOL/L (ref 135–146)
T4, FREE: 1.3 NG/DL (ref 0.8–1.8)
TRIGLYCERIDES: 84 MG/DL
TSH: 1.6 MIU/L (ref 0.4–4.5)
WHITE BLOOD CELL COUNT: 4.1 THOUSAND/UL (ref 3.8–10.8)

## 2022-02-10 NOTE — PROGRESS NOTES
Informed pt of results and recommendations per DINORAH, verb understanding. Offered to schedule OV with JEOVANY. Pt declined stating she is going on vacation with her  and will call to schedule when back in town.

## 2022-02-10 NOTE — PROGRESS NOTES
Please call and inform us thyroid showing left sided nodule overall no significant changes but she does need an office visit with dr Malvin Nieves to further discuss

## 2022-02-23 ENCOUNTER — HOSPITAL ENCOUNTER (OUTPATIENT)
Dept: MAMMOGRAPHY | Age: 72
Discharge: HOME | End: 2022-02-23
Attending: FAMILY MEDICINE
Payer: MEDICARE

## 2022-02-23 DIAGNOSIS — Z12.31 ENCOUNTER FOR SCREENING MAMMOGRAM FOR MALIGNANT NEOPLASM OF BREAST: ICD-10-CM

## 2022-02-23 PROCEDURE — 77067 SCR MAMMO BI INCL CAD: CPT | Performed by: FAMILY MEDICINE

## 2022-02-23 PROCEDURE — 77063 BREAST TOMOSYNTHESIS BI: CPT | Performed by: FAMILY MEDICINE

## 2022-05-21 LAB
ALBUMIN/GLOBULIN RATIO: 1.7 (CALC) (ref 1–2.5)
ALBUMIN: 4.3 G/DL (ref 3.6–5.1)
ALKALINE PHOSPHATASE: 88 U/L (ref 37–153)
ALT: 13 U/L (ref 6–29)
AST: 16 U/L (ref 10–35)
BILIRUBIN, TOTAL: 1.9 MG/DL (ref 0.2–1.2)
BUN/CREATININE RATIO: 19 (CALC) (ref 6–22)
BUN: 18 MG/DL (ref 7–25)
CALCIUM: 9.4 MG/DL (ref 8.6–10.4)
CARBON DIOXIDE: 29 MMOL/L (ref 20–32)
CHLORIDE: 102 MMOL/L (ref 98–110)
CHOL/HDLC RATIO: 2.4 (CALC)
CHOLESTEROL, TOTAL: 135 MG/DL
CREATININE: 0.95 MG/DL (ref 0.6–0.93)
EGFR IF AFRICN AM: 70 ML/MIN/1.73M2
EGFR IF NONAFRICN AM: 60 ML/MIN/1.73M2
GLOBULIN: 2.6 G/DL (CALC) (ref 1.9–3.7)
GLUCOSE: 86 MG/DL (ref 65–99)
HDL CHOLESTEROL: 56 MG/DL
LDL-CHOLESTEROL: 61 MG/DL (CALC)
NON-HDL CHOLESTEROL: 79 MG/DL (CALC)
POTASSIUM: 4.3 MMOL/L (ref 3.5–5.3)
PROTEIN, TOTAL: 6.9 G/DL (ref 6.1–8.1)
SODIUM: 140 MMOL/L (ref 135–146)
TRIGLYCERIDES: 92 MG/DL

## 2022-05-23 ENCOUNTER — TELEPHONE (OUTPATIENT)
Dept: FAMILY MEDICINE CLINIC | Facility: CLINIC | Age: 72
End: 2022-05-23

## 2022-05-23 DIAGNOSIS — E78.00 PURE HYPERCHOLESTEROLEMIA: ICD-10-CM

## 2022-05-23 RX ORDER — ATORVASTATIN CALCIUM 10 MG/1
10 TABLET, FILM COATED ORAL NIGHTLY
Qty: 90 TABLET | Refills: 1 | Status: SHIPPED | OUTPATIENT
Start: 2022-05-23

## 2022-05-23 NOTE — TELEPHONE ENCOUNTER
----- Message from Lupe Wilson DO sent at 5/21/2022  9:17 AM CDT -----  Mychart sent: Jus, your cholesterol looks great. I recommend continuing the cholesterol medicine at the same dose. Your kidney and liver tests are stable.  Nothing I am worried about. - Dr. Luis M Kim

## 2022-05-23 NOTE — TELEPHONE ENCOUNTER
Patient notified and verbalized understanding.      Patient requests script for atorvastatin sent to [address] [place] [address],[address] 2/1/22  Last refilled 2/11/22 #90  1 refill    Refilled x 6 months per protocol

## 2022-05-26 DIAGNOSIS — E78.00 PURE HYPERCHOLESTEROLEMIA: ICD-10-CM

## 2022-05-26 RX ORDER — ATORVASTATIN CALCIUM 10 MG/1
10 TABLET, FILM COATED ORAL NIGHTLY
Qty: 90 TABLET | Refills: 1 | OUTPATIENT
Start: 2022-05-26

## 2022-09-13 ENCOUNTER — TELEPHONE (OUTPATIENT)
Dept: FAMILY MEDICINE CLINIC | Facility: CLINIC | Age: 72
End: 2022-09-13

## 2022-09-13 DIAGNOSIS — E78.00 PURE HYPERCHOLESTEROLEMIA: ICD-10-CM

## 2022-09-13 RX ORDER — ATORVASTATIN CALCIUM 10 MG/1
10 TABLET, FILM COATED ORAL NIGHTLY
Qty: 90 TABLET | Refills: 1 | Status: SHIPPED | OUTPATIENT
Start: 2022-09-13

## 2022-09-13 NOTE — TELEPHONE ENCOUNTER
Last OV 2/1/22  Last lab 5/20/22 Lipid, CMP/ALT 13  Last refilled 5/23/22  #90  1 refill    Refilled x 6 months per protocol

## 2022-10-17 ENCOUNTER — OFFICE VISIT (OUTPATIENT)
Dept: FAMILY MEDICINE CLINIC | Facility: CLINIC | Age: 72
End: 2022-10-17
Payer: MEDICARE

## 2022-10-17 VITALS
BODY MASS INDEX: 34 KG/M2 | TEMPERATURE: 98 F | WEIGHT: 196.19 LBS | HEART RATE: 74 BPM | OXYGEN SATURATION: 97 % | DIASTOLIC BLOOD PRESSURE: 72 MMHG | SYSTOLIC BLOOD PRESSURE: 112 MMHG | RESPIRATION RATE: 16 BRPM

## 2022-10-17 DIAGNOSIS — H65.91 RIGHT NON-SUPPURATIVE OTITIS MEDIA: Primary | ICD-10-CM

## 2022-10-17 PROCEDURE — 3078F DIAST BP <80 MM HG: CPT | Performed by: FAMILY MEDICINE

## 2022-10-17 PROCEDURE — 3074F SYST BP LT 130 MM HG: CPT | Performed by: FAMILY MEDICINE

## 2022-10-17 PROCEDURE — 99214 OFFICE O/P EST MOD 30 MIN: CPT | Performed by: FAMILY MEDICINE

## 2022-10-17 RX ORDER — CEFDINIR 300 MG/1
300 CAPSULE ORAL 2 TIMES DAILY
Qty: 20 CAPSULE | Refills: 0 | Status: SHIPPED | OUTPATIENT
Start: 2022-10-17 | End: 2022-10-27

## 2023-01-03 ENCOUNTER — OFFICE VISIT (OUTPATIENT)
Dept: FAMILY MEDICINE CLINIC | Facility: CLINIC | Age: 73
End: 2023-01-03
Payer: MEDICARE

## 2023-01-03 VITALS
SYSTOLIC BLOOD PRESSURE: 112 MMHG | WEIGHT: 201 LBS | HEIGHT: 64 IN | HEART RATE: 74 BPM | BODY MASS INDEX: 34.31 KG/M2 | DIASTOLIC BLOOD PRESSURE: 68 MMHG | TEMPERATURE: 98 F | RESPIRATION RATE: 16 BRPM | OXYGEN SATURATION: 98 %

## 2023-01-03 DIAGNOSIS — Z80.0 FAMILY HISTORY OF COLON CANCER: ICD-10-CM

## 2023-01-03 DIAGNOSIS — E89.0 POSTOPERATIVE HYPOTHYROIDISM: ICD-10-CM

## 2023-01-03 DIAGNOSIS — E78.5 HYPERLIPIDEMIA, UNSPECIFIED HYPERLIPIDEMIA TYPE: ICD-10-CM

## 2023-01-03 DIAGNOSIS — R00.2 PALPITATIONS: ICD-10-CM

## 2023-01-03 DIAGNOSIS — E66.09 CLASS 1 OBESITY DUE TO EXCESS CALORIES WITHOUT SERIOUS COMORBIDITY WITH BODY MASS INDEX (BMI) OF 30.0 TO 30.9 IN ADULT: ICD-10-CM

## 2023-01-03 DIAGNOSIS — Z12.31 ENCOUNTER FOR SCREENING MAMMOGRAM FOR MALIGNANT NEOPLASM OF BREAST: ICD-10-CM

## 2023-01-03 DIAGNOSIS — R20.2 NUMBNESS AND TINGLING: ICD-10-CM

## 2023-01-03 DIAGNOSIS — Z00.00 ENCOUNTER FOR ANNUAL HEALTH EXAMINATION: Primary | ICD-10-CM

## 2023-01-03 DIAGNOSIS — E87.6 HYPOKALEMIA: ICD-10-CM

## 2023-01-03 DIAGNOSIS — R20.0 NUMBNESS AND TINGLING: ICD-10-CM

## 2023-01-03 PROCEDURE — G0439 PPPS, SUBSEQ VISIT: HCPCS | Performed by: FAMILY MEDICINE

## 2023-01-03 PROCEDURE — 3074F SYST BP LT 130 MM HG: CPT | Performed by: FAMILY MEDICINE

## 2023-01-03 PROCEDURE — 3078F DIAST BP <80 MM HG: CPT | Performed by: FAMILY MEDICINE

## 2023-01-03 PROCEDURE — 99397 PER PM REEVAL EST PAT 65+ YR: CPT | Performed by: FAMILY MEDICINE

## 2023-01-03 PROCEDURE — 3008F BODY MASS INDEX DOCD: CPT | Performed by: FAMILY MEDICINE

## 2023-01-03 PROCEDURE — 1126F AMNT PAIN NOTED NONE PRSNT: CPT | Performed by: FAMILY MEDICINE

## 2023-01-03 PROCEDURE — 96160 PT-FOCUSED HLTH RISK ASSMT: CPT | Performed by: FAMILY MEDICINE

## 2023-01-07 LAB
ABSOLUTE BASOPHILS: 40 CELLS/UL (ref 0–200)
ABSOLUTE EOSINOPHILS: 128 CELLS/UL (ref 15–500)
ABSOLUTE LYMPHOCYTES: 1813 CELLS/UL (ref 850–3900)
ABSOLUTE MONOCYTES: 339 CELLS/UL (ref 200–950)
ABSOLUTE NEUTROPHILS: 2081 CELLS/UL (ref 1500–7800)
ALBUMIN/GLOBULIN RATIO: 1.8 (CALC) (ref 1–2.5)
ALBUMIN: 4.4 G/DL (ref 3.6–5.1)
ALKALINE PHOSPHATASE: 81 U/L (ref 37–153)
ALT: 18 U/L (ref 6–29)
AST: 19 U/L (ref 10–35)
BASOPHILS: 0.9 %
BILIRUBIN, TOTAL: 2.1 MG/DL (ref 0.2–1.2)
BUN: 22 MG/DL (ref 7–25)
CALCIUM: 9.6 MG/DL (ref 8.6–10.4)
CARBON DIOXIDE: 28 MMOL/L (ref 20–32)
CHLORIDE: 103 MMOL/L (ref 98–110)
CHOL/HDLC RATIO: 2.7 (CALC)
CHOLESTEROL, TOTAL: 157 MG/DL
CREATININE: 0.96 MG/DL (ref 0.6–1)
EGFR: 63 ML/MIN/1.73M2
EOSINOPHILS: 2.9 %
FOLATE, SERUM: >24 NG/ML
GLOBULIN: 2.5 G/DL (CALC) (ref 1.9–3.7)
GLUCOSE: 90 MG/DL (ref 65–99)
HDL CHOLESTEROL: 58 MG/DL
HEMATOCRIT: 48 % (ref 35–45)
HEMOGLOBIN A1C: 4.6 % OF TOTAL HGB
HEMOGLOBIN: 15.6 G/DL (ref 11.7–15.5)
LDL-CHOLESTEROL: 83 MG/DL (CALC)
LYMPHOCYTES: 41.2 %
MCH: 28 PG (ref 27–33)
MCHC: 32.5 G/DL (ref 32–36)
MCV: 86 FL (ref 80–100)
MONOCYTES: 7.7 %
MPV: 11.2 FL (ref 7.5–12.5)
NEUTROPHILS: 47.3 %
NON-HDL CHOLESTEROL: 99 MG/DL (CALC)
PLATELET COUNT: 214 THOUSAND/UL (ref 140–400)
POTASSIUM: 4.1 MMOL/L (ref 3.5–5.3)
PROTEIN, TOTAL: 6.9 G/DL (ref 6.1–8.1)
RDW: 12.7 % (ref 11–15)
RED BLOOD CELL COUNT: 5.58 MILLION/UL (ref 3.8–5.1)
SODIUM: 140 MMOL/L (ref 135–146)
TRIGLYCERIDES: 75 MG/DL
TSH W/REFLEX TO FT4: 1.58 MIU/L (ref 0.4–4.5)
VITAMIN B12: 1317 PG/ML (ref 200–1100)
WHITE BLOOD CELL COUNT: 4.4 THOUSAND/UL (ref 3.8–10.8)

## 2023-01-19 DIAGNOSIS — E89.0 POSTOPERATIVE HYPOTHYROIDISM: ICD-10-CM

## 2023-01-19 DIAGNOSIS — I10 ESSENTIAL HYPERTENSION: ICD-10-CM

## 2023-01-20 RX ORDER — LEVOTHYROXINE SODIUM 88 UG/1
TABLET ORAL
Qty: 90 TABLET | Refills: 3 | Status: SHIPPED | OUTPATIENT
Start: 2023-01-20

## 2023-01-20 RX ORDER — AMLODIPINE BESYLATE 5 MG/1
TABLET ORAL
Qty: 90 TABLET | Refills: 3 | Status: SHIPPED | OUTPATIENT
Start: 2023-01-20

## 2023-01-20 NOTE — TELEPHONE ENCOUNTER
Hypertension Medications Protocol Passed 01/19/2023 08:11 PM   Protocol Details  CMP or BMP in past 12 months    Last serum creatinine< 2.0    Appointment in past 6 or next 3 months        Thyroid Supplements Protocol Passed 01/19/2023 08:11 PM   Protocol Details  TSH test in past 12 months    TSH value between 0.350 and 5.500 IU/ml    Appointment in past 12 or next 3 months        Last OV 1/3/23  /68  Last lab 1/6/23 cmp/creat 0.96, tsh 1.58  Both meds last refilled 12/3/21  #90  3 refills     Per v/o Dr Levon Odonnell, ok to refill both meds x 12 months    Scripts sent

## 2023-02-14 ENCOUNTER — OFFICE VISIT (OUTPATIENT)
Dept: OBGYN CLINIC | Facility: CLINIC | Age: 73
End: 2023-02-14
Payer: MEDICARE

## 2023-02-14 VITALS
DIASTOLIC BLOOD PRESSURE: 76 MMHG | HEIGHT: 64 IN | HEART RATE: 76 BPM | SYSTOLIC BLOOD PRESSURE: 120 MMHG | BODY MASS INDEX: 33.91 KG/M2 | WEIGHT: 198.63 LBS

## 2023-02-14 DIAGNOSIS — Z01.419 WELL WOMAN EXAM WITH ROUTINE GYNECOLOGICAL EXAM: Primary | ICD-10-CM

## 2023-02-14 DIAGNOSIS — Z12.4 CERVICAL CANCER SCREENING: ICD-10-CM

## 2023-02-24 ENCOUNTER — HOSPITAL ENCOUNTER (OUTPATIENT)
Dept: MAMMOGRAPHY | Age: 73
Discharge: HOME | End: 2023-02-24
Attending: FAMILY MEDICINE
Payer: MEDICARE

## 2023-02-24 DIAGNOSIS — Z12.31 ENCOUNTER FOR SCREENING MAMMOGRAM FOR MALIGNANT NEOPLASM OF BREAST: ICD-10-CM

## 2023-02-24 PROCEDURE — 77063 BREAST TOMOSYNTHESIS BI: CPT | Performed by: FAMILY MEDICINE

## 2023-02-24 PROCEDURE — 77067 SCR MAMMO BI INCL CAD: CPT | Performed by: FAMILY MEDICINE

## 2023-02-26 LAB — HPV I/H RISK 1 DNA SPEC QL NAA+PROBE: NEGATIVE

## 2023-06-10 ENCOUNTER — PATIENT MESSAGE (OUTPATIENT)
Dept: FAMILY MEDICINE CLINIC | Facility: CLINIC | Age: 73
End: 2023-06-10

## 2023-06-10 DIAGNOSIS — J01.90 ACUTE NON-RECURRENT SINUSITIS, UNSPECIFIED LOCATION: Primary | ICD-10-CM

## 2023-06-10 RX ORDER — AMOXICILLIN AND CLAVULANATE POTASSIUM 875; 125 MG/1; MG/1
1 TABLET, FILM COATED ORAL 2 TIMES DAILY
Qty: 20 TABLET | Refills: 0 | Status: SHIPPED | OUTPATIENT
Start: 2023-06-10 | End: 2023-06-20

## 2023-06-10 NOTE — TELEPHONE ENCOUNTER
She should start taking a daily antihistamin and flonase or nasocort if not already. I will send script for abx to take if symptoms do not start getting better in the next few days.

## 2023-06-10 NOTE — TELEPHONE ENCOUNTER
Left message to voicemail (per verbal release form consent, confirmed with identifying message.)  Advised patient to call office back [phone] - need to triage    [facility] sent to pt  Pt to respond  Notify me if not read by 06/12/23

## 2023-06-10 NOTE — TELEPHONE ENCOUNTER
Pt called backs - reports has been in [place] for 7 days and \"allergies are horrendous here\"    Allergies - c/o Headaches, sinus pressure, nose runny, post nasal drip, mild cough    Has taken advil, motrin, tylenol cold and sinus  Alternating - does help a little    No fevers, does feel hot, but no temp  Negative covid test    Pt requesting Rx to be sent to [facility] in [place]    Please advise, thank you

## 2023-06-28 ENCOUNTER — MED REC SCAN ONLY (OUTPATIENT)
Dept: FAMILY MEDICINE CLINIC | Facility: CLINIC | Age: 73
End: 2023-06-28

## 2023-07-28 DIAGNOSIS — E78.00 PURE HYPERCHOLESTEROLEMIA: ICD-10-CM

## 2023-07-28 RX ORDER — ATORVASTATIN CALCIUM 10 MG/1
10 TABLET, FILM COATED ORAL NIGHTLY
Qty: 90 TABLET | Refills: 1 | Status: SHIPPED | OUTPATIENT
Start: 2023-07-28

## 2023-07-28 NOTE — TELEPHONE ENCOUNTER
Cholesterol Medication Protocol Passed07/28/2023 10:51 AM   Protocol Details ALT < 80    ALT resulted within past year    Lipid panel within past 12 months    Appointment within past 12 or next 3 months      Medication refilled per protocol

## 2023-10-05 ENCOUNTER — HOSPITAL ENCOUNTER (EMERGENCY)
Age: 73
Discharge: HOME | End: 2023-10-05
Attending: EMERGENCY MEDICINE

## 2023-10-05 ENCOUNTER — TELEPHONE (OUTPATIENT)
Dept: FAMILY MEDICINE CLINIC | Facility: CLINIC | Age: 73
End: 2023-10-05

## 2023-10-05 ENCOUNTER — APPOINTMENT (OUTPATIENT)
Dept: CT IMAGING | Age: 73
End: 2023-10-05
Attending: EMERGENCY MEDICINE

## 2023-10-05 ENCOUNTER — APPOINTMENT (OUTPATIENT)
Dept: GENERAL RADIOLOGY | Age: 73
End: 2023-10-05
Attending: EMERGENCY MEDICINE

## 2023-10-05 VITALS
SYSTOLIC BLOOD PRESSURE: 128 MMHG | TEMPERATURE: 98 F | HEIGHT: 64 IN | BODY MASS INDEX: 32.78 KG/M2 | HEART RATE: 78 BPM | OXYGEN SATURATION: 100 % | WEIGHT: 192 LBS | DIASTOLIC BLOOD PRESSURE: 72 MMHG | RESPIRATION RATE: 14 BRPM

## 2023-10-05 DIAGNOSIS — R55 SYNCOPE, VASOVAGAL: Primary | ICD-10-CM

## 2023-10-05 LAB
ALBUMIN SERPL-MCNC: 3.7 G/DL (ref 3.4–5)
ALBUMIN/GLOB SERPL: 1 {RATIO} (ref 1–2)
ALP LIVER SERPL-CCNC: 97 U/L
ALT SERPL-CCNC: 32 U/L
ANION GAP SERPL CALC-SCNC: 4 MMOL/L (ref 0–18)
AST SERPL-CCNC: 26 U/L (ref 15–37)
ATRIAL RATE: 71 BPM
BASOPHILS # BLD AUTO: 0.03 X10(3) UL (ref 0–0.2)
BASOPHILS NFR BLD AUTO: 0.3 %
BILIRUB SERPL-MCNC: 1.8 MG/DL (ref 0.1–2)
BUN BLD-MCNC: 16 MG/DL (ref 7–18)
CALCIUM BLD-MCNC: 9.1 MG/DL (ref 8.5–10.1)
CHLORIDE SERPL-SCNC: 106 MMOL/L (ref 98–112)
CO2 SERPL-SCNC: 29 MMOL/L (ref 21–32)
CREAT BLD-MCNC: 1.17 MG/DL
EGFRCR SERPLBLD CKD-EPI 2021: 50 ML/MIN/1.73M2 (ref 60–?)
EOSINOPHIL # BLD AUTO: 0.08 X10(3) UL (ref 0–0.7)
EOSINOPHIL NFR BLD AUTO: 0.9 %
ERYTHROCYTE [DISTWIDTH] IN BLOOD BY AUTOMATED COUNT: 13.5 %
GLOBULIN PLAS-MCNC: 3.6 G/DL (ref 2.8–4.4)
GLUCOSE BLD-MCNC: 100 MG/DL (ref 70–99)
HCT VFR BLD AUTO: 47.1 %
HGB BLD-MCNC: 14.9 G/DL
IMM GRANULOCYTES # BLD AUTO: 0.02 X10(3) UL (ref 0–1)
IMM GRANULOCYTES NFR BLD: 0.2 %
LYMPHOCYTES # BLD AUTO: 1.04 X10(3) UL (ref 1–4)
LYMPHOCYTES NFR BLD AUTO: 11.9 %
MCH RBC QN AUTO: 27.7 PG (ref 26–34)
MCHC RBC AUTO-ENTMCNC: 31.6 G/DL (ref 31–37)
MCV RBC AUTO: 87.5 FL
MONOCYTES # BLD AUTO: 0.55 X10(3) UL (ref 0.1–1)
MONOCYTES NFR BLD AUTO: 6.3 %
NEUTROPHILS # BLD AUTO: 7.05 X10 (3) UL (ref 1.5–7.7)
NEUTROPHILS # BLD AUTO: 7.05 X10(3) UL (ref 1.5–7.7)
NEUTROPHILS NFR BLD AUTO: 80.4 %
OSMOLALITY SERPL CALC.SUM OF ELEC: 289 MOSM/KG (ref 275–295)
P AXIS: 34 DEGREES
P-R INTERVAL: 144 MS
PLATELET # BLD AUTO: 195 10(3)UL (ref 150–450)
POTASSIUM SERPL-SCNC: 4.1 MMOL/L (ref 3.5–5.1)
PROT SERPL-MCNC: 7.3 G/DL (ref 6.4–8.2)
Q-T INTERVAL: 402 MS
QRS DURATION: 92 MS
QTC CALCULATION (BEZET): 436 MS
R AXIS: 32 DEGREES
RBC # BLD AUTO: 5.38 X10(6)UL
SODIUM SERPL-SCNC: 139 MMOL/L (ref 136–145)
T AXIS: 67 DEGREES
TROPONIN I HIGH SENSITIVITY: 8 NG/L
TROPONIN I HIGH SENSITIVITY: 9 NG/L
VENTRICULAR RATE: 71 BPM
WBC # BLD AUTO: 8.8 X10(3) UL (ref 4–11)

## 2023-10-05 PROCEDURE — 99285 EMERGENCY DEPT VISIT HI MDM: CPT

## 2023-10-05 PROCEDURE — 96360 HYDRATION IV INFUSION INIT: CPT

## 2023-10-05 PROCEDURE — 84484 ASSAY OF TROPONIN QUANT: CPT | Performed by: EMERGENCY MEDICINE

## 2023-10-05 PROCEDURE — 70450 CT HEAD/BRAIN W/O DYE: CPT | Performed by: EMERGENCY MEDICINE

## 2023-10-05 PROCEDURE — 93010 ELECTROCARDIOGRAM REPORT: CPT

## 2023-10-05 PROCEDURE — 93005 ELECTROCARDIOGRAM TRACING: CPT

## 2023-10-05 PROCEDURE — 96361 HYDRATE IV INFUSION ADD-ON: CPT

## 2023-10-05 PROCEDURE — 80053 COMPREHEN METABOLIC PANEL: CPT | Performed by: EMERGENCY MEDICINE

## 2023-10-05 PROCEDURE — 71101 X-RAY EXAM UNILAT RIBS/CHEST: CPT | Performed by: EMERGENCY MEDICINE

## 2023-10-05 PROCEDURE — 85025 COMPLETE CBC W/AUTO DIFF WBC: CPT | Performed by: EMERGENCY MEDICINE

## 2023-10-05 NOTE — ED INITIAL ASSESSMENT (HPI)
While having a bm this am about 0930 this am pt had a syncopal episode. Reports left rib pain and head pain after hitting it.   Reports she feels \"shakey\" right now

## 2023-10-05 NOTE — TELEPHONE ENCOUNTER
Patient states she went downstairs to do a load of laundry  Had to have a BM so rushed upstairs. States she had BM, then started to feel nauseous and sweaty, felt like she was going to pass out. States she tried getting to the recliner and fell in the hallway. States he fell and hit her head/face and left side. States her  was there and helped her up. States she passed out a second time. Spoke with MISSY who states patient needs and ER evaluation now. If does not have a ride call ambulance. Concerned there is something going on with heart. Needs urgent evaluation. Patient notified and verbalized understanding. Patient states she does not think she has the energy to sit all day at the ER  Advised heart related concerns are seen right away. Also looked [facility] ER wait times and advised [place] 17 min and [place] 3 min wait at this time. Advised needs to be seen right away. Patient asking if she can take motrin for left side pain. Per MISSY, take aspirin if available. Patient notified and states she does not have ASA. Per MISSY, ok for motrin. Patient verbalized understanding.

## 2023-10-05 NOTE — ED QUICK NOTES
Pt ambulated in hallway without difficulty. Denies complaints. Returned to room without incident. MD notified/aware.

## 2023-10-12 ENCOUNTER — OFFICE VISIT (OUTPATIENT)
Dept: FAMILY MEDICINE CLINIC | Facility: CLINIC | Age: 73
End: 2023-10-12
Payer: MEDICARE

## 2023-10-12 VITALS
BODY MASS INDEX: 33 KG/M2 | DIASTOLIC BLOOD PRESSURE: 72 MMHG | OXYGEN SATURATION: 98 % | RESPIRATION RATE: 18 BRPM | HEART RATE: 76 BPM | WEIGHT: 191.13 LBS | SYSTOLIC BLOOD PRESSURE: 120 MMHG | TEMPERATURE: 97 F

## 2023-10-12 DIAGNOSIS — R55 VASOVAGAL SYNCOPE: Primary | ICD-10-CM

## 2023-10-12 PROCEDURE — 3078F DIAST BP <80 MM HG: CPT | Performed by: FAMILY MEDICINE

## 2023-10-12 PROCEDURE — 3074F SYST BP LT 130 MM HG: CPT | Performed by: FAMILY MEDICINE

## 2023-10-12 PROCEDURE — 1160F RVW MEDS BY RX/DR IN RCRD: CPT | Performed by: FAMILY MEDICINE

## 2023-10-12 PROCEDURE — 1159F MED LIST DOCD IN RCRD: CPT | Performed by: FAMILY MEDICINE

## 2023-10-12 PROCEDURE — 99214 OFFICE O/P EST MOD 30 MIN: CPT | Performed by: FAMILY MEDICINE

## 2023-11-16 ENCOUNTER — TELEPHONE (OUTPATIENT)
Dept: FAMILY MEDICINE CLINIC | Facility: CLINIC | Age: 73
End: 2023-11-16

## 2023-11-16 DIAGNOSIS — R42 INTERMITTENT LIGHTHEADEDNESS: ICD-10-CM

## 2023-11-16 DIAGNOSIS — R00.2 PALPITATIONS: Primary | ICD-10-CM

## 2023-11-17 NOTE — TELEPHONE ENCOUNTER
Patient states sometimes when she has palpitations/heart is pounding she needs to park closer  States she has had palpitations for a long time. Asked patient if she is being treated for palpitations and she replied she is on BP med and lipitor  Also had meningitis 5 years ago. States she only uses the placard if she feels very tired and it is going to be a long walk.     Does not use it all the time

## 2023-11-17 NOTE — TELEPHONE ENCOUNTER
Patient notified and verbalized understanding. Patient states she does not feel she needs further cardiac testing. States she has had an ultrafast heart scan, stress test and was just at the ER on a hear monitor for 3 hours and everything was fine. Starts she does not have palpitations daily, they do not last hours when she has them. States they do not affect her activities. States she can feel them and on those days she tries to park closer to the door when she has to go somewhere. States she already has a placard and this is for a renewal.  States she originally had the placard from Dr Dana Green at [facility] who treated her for meningitis 5 years ago. States it was a long recovery and she was having memory loss, had trouble finding her car in a parking lot. States Dr Dana Green recommended the placard. Discussed with patient the parking placard criteria is specific and for KE to renew she needs to check that patient has specific conditions, which she does not have. Patient verbalized understanding. States she will go without the placard.  Will discuss further at upcoming appt in February

## 2023-11-17 NOTE — TELEPHONE ENCOUNTER
I saw her last month for her passing out episode. I think if she is having episodes of palpitations and feeling weak, we need to do some more testing. I ordered a heart monitor and an echo to look into that more. As far as the form, I do not have a diagnosis at this time to justify the parking placard. Usually it's lung or heart disease, a nerve or arthritis condition. We don't fill them out for more vague complaints. We need to evaluate you more first.   If you have palpitations and are not feeling well, please do not drive at all. If you are feeling dizzy and palpitations persist for more than a few minutes,  go to the ER.

## 2023-12-19 ENCOUNTER — TELEPHONE (OUTPATIENT)
Dept: FAMILY MEDICINE CLINIC | Facility: CLINIC | Age: 73
End: 2023-12-19

## 2023-12-19 NOTE — TELEPHONE ENCOUNTER
Lab Frequency Next Occurrence   CARD MONITOR HOLTER 48 HOUR (CPT=93225) Once 11/17/2023   CARD ECHO 2D DOPPLER (CPT=93306) Once 11/17/2023     Letter mailed to patient reminding them they have outstanding orders.

## 2024-02-15 ENCOUNTER — OFFICE VISIT (OUTPATIENT)
Dept: FAMILY MEDICINE CLINIC | Facility: CLINIC | Age: 74
End: 2024-02-15
Payer: MEDICARE

## 2024-02-15 VITALS
WEIGHT: 192 LBS | SYSTOLIC BLOOD PRESSURE: 120 MMHG | BODY MASS INDEX: 33 KG/M2 | TEMPERATURE: 97 F | HEART RATE: 87 BPM | RESPIRATION RATE: 17 BRPM | DIASTOLIC BLOOD PRESSURE: 72 MMHG | OXYGEN SATURATION: 98 %

## 2024-02-15 DIAGNOSIS — Z80.0 FAMILY HISTORY OF COLON CANCER: ICD-10-CM

## 2024-02-15 DIAGNOSIS — Z00.00 ENCOUNTER FOR ANNUAL HEALTH EXAMINATION: ICD-10-CM

## 2024-02-15 DIAGNOSIS — Z12.31 ENCOUNTER FOR SCREENING MAMMOGRAM FOR MALIGNANT NEOPLASM OF BREAST: Primary | ICD-10-CM

## 2024-02-15 DIAGNOSIS — E78.5 HYPERLIPIDEMIA, UNSPECIFIED HYPERLIPIDEMIA TYPE: ICD-10-CM

## 2024-02-15 DIAGNOSIS — R00.2 PALPITATIONS: ICD-10-CM

## 2024-02-15 DIAGNOSIS — Z12.11 SCREENING FOR MALIGNANT NEOPLASM OF COLON: ICD-10-CM

## 2024-02-15 DIAGNOSIS — E87.6 HYPOKALEMIA: ICD-10-CM

## 2024-02-15 DIAGNOSIS — E89.0 POSTOPERATIVE HYPOTHYROIDISM: ICD-10-CM

## 2024-02-15 DIAGNOSIS — E66.09 CLASS 1 OBESITY DUE TO EXCESS CALORIES WITHOUT SERIOUS COMORBIDITY WITH BODY MASS INDEX (BMI) OF 30.0 TO 30.9 IN ADULT: ICD-10-CM

## 2024-02-15 NOTE — PROGRESS NOTES
HPI:   Cydney Hart is a 74 year old female who presents for a Medicare Subsequent Annual Wellness visit (Pt already had Initial Annual Wellness).    Thyroid nodules and tinnitus: following with Dr. Dougherty. Stable. Some hearing loss.     Using her hands a lot makes her hands sore and swelling.      Doing weight watchers, regained some of the weight. Working on getting back down to goal weight of 185.      Palpitations: doing well with amlodipine. Every once in a while feels palpitations, especially when going up and down stairs. BP wnl.   No more episodes of lightheadedness. Never did cardiac testing.     CT heart score done in 2022 showed score of 22.  Incidentally showed dilated thoracic aorta 3.9 cm.         She has been screened for Falls and is low risk.    She had a completely normal cognitive assessment - see flowsheet entries    Cydney Hart has some abnormal functions as listed below:  She has Dressing and/or Bathing issues based on screening of functional status.  Difficulty dressing or bathing?: No  Bathing or Showering: Cannot do without help  Dressing: Cannot do without help  She has Toileting difficulties based on screening of functional status.She has Eating difficulties based on screening of functional status.She has Driving difficulties based on screening of functional status. She has Meal Preparation difficulties based on screening of functional status.She has difficulties Managing Money/Bills based on screening of functional status.She has difficulties Shopping for Groceries based on screening of functional status. She has difficulties Taking Meds as Rx'd based on screening of functional status. She has Hearing problems based on screening of functional status.She has Vision problems based on screening of functional status.       Depression Screening (PHQ-2/PHQ-9): Over the LAST 2 WEEKS   Little interest or pleasure in doing things: Not at all  Feeling down, depressed, or hopeless: Not  at all  PHQ-2 SCORE: 0      Advanced Directive:  She does NOT have a Living Will on file in [facility].   The patient has this document but we do not have it in [facility], and patient is instructed to get our office a copy of it for scanning into Epic.  Has this on file.      She does NOT have a Power of  for Health Care on file in [facility].   The patient has this document but we do not have it in [facility], and patient is instructed to get our office a copy of it for scanning into [facility]. son is designated as POA.        She has never smoked tobacco.  Ms. Hart does not currently take aspirin. We discussed the risks and benefits of aspirin therapy.   Cydney Hart is unable to use daily aspirin therapy For the following reasons:   Other: discussed with pt, she will think about it.         CAGE Alcohol screening   Cydney Hart was screened for Alcohol abuse and had a score of 0 so is at low risk.    Patient Care Team: Patient Care Team:  Mohini Sanchez DO as PCP - General (Family Medicine)  Mg Carmona MD as Consulting Physician (NEUROLOGY)  Beth Patel APN as Obstetrician (Nurse Practitioner Women's Health)    Patient Active Problem List   Diagnosis    Postoperative hypothyroidism    Family history of colon cancer    Hypokalemia    Hyperlipidemia    Obesity, unspecified    Palpitations     Wt Readings from Last 3 Encounters:   02/15/24 192 lb (87.1 kg)   10/12/23 191 lb 2 oz (86.7 kg)   10/05/23 192 lb (87.1 kg)      Last Cholesterol Labs:   Lab Results   Component Value Date    CHOLEST 157 01/06/2023    HDL 58 01/06/2023    LDL 83 01/06/2023    TRIG 75 01/06/2023          Last Chemistry Labs:   Lab Results   Component Value Date    AST 26 10/05/2023    ALT 32 10/05/2023    CA 9.1 10/05/2023    ALB 3.7 10/05/2023    TSH 1.60 02/09/2022    CREATSERUM 1.17 (H) 10/05/2023     (H) 10/05/2023        CBC  (most recent labs)   Lab Results   Component Value Date    WBC 8.8 10/05/2023    HGB 14.9  10/05/2023    .0 10/05/2023        ALLERGIES:   She is allergic to morphine.    CURRENT MEDICATIONS:   Outpatient Medications Marked as Taking for the 2/15/24 encounter (Office Visit) with Mohini Sanchez DO   Medication Sig    ATORVASTATIN 10 MG Oral Tab TAKE 1 TABLET NIGHTLY.    AMLODIPINE 5 MG Oral Tab TAKE 1 TABLET EVERY DAY    levothyroxine 88 MCG Oral Tab TAKE 1 TABLET EVERY DAY BEFORE BREAKFAST    Cholecalciferol (VITAMIN D) 1000 units Oral Tab Take 10,000 Int'l Units/day by mouth.      Cyanocobalamin (VITAMIN B-12 OR) Take by mouth.      MEDICAL INFORMATION:   She  has a past medical history of Disorder of thyroid, Encephalitis (1/7/2018), Migraines, and Problems with swallowing.    She  has a past surgical history that includes removal gallbladder; knee surgery; holly biopsy stereo nodule 1 site right (cpt=19081) (11/2016); cholecystectomy; and benign biopsy right.    Her family history includes Colon Cancer in her father; Depression in her paternal grandmother; Heart Attack in her father; Stroke in her mother; brain cancer (age of onset: 62) in her brother; gout in her brother, brother, and father; hardening of the arteries in her paternal grandmother; nonalc cirrhosis in her brother; thyroid cancer in her brother.   SOCIAL HISTORY:   She  reports that she has never smoked. She has never used smokeless tobacco. She reports that she does not drink alcohol and does not use drugs.     REVIEW OF SYSTEMS:   GENERAL: feels well otherwise  SKIN: denies any unusual skin lesions  EYES: denies blurred vision or double vision  HEENT: denies nasal congestion, sinus pain or ST  LUNGS: denies shortness of breath with exertion  CARDIOVASCULAR: denies chest pain on exertion  GI: denies abdominal pain, denies heartburn  : denies dysuria, vaginal discharge or itching, no complaint of urinary incontinence   MUSCULOSKELETAL: denies back pain or joint pain, + hand pain as above.   NEURO: denies headaches  PSYCHE:  denies depression or anxiety  HEMATOLOGIC: denies hx of anemia  ENDOCRINE: denies thyroid history  ALL/ASTHMA: denies hx of allergy or asthma    EXAM:   /72 (BP Location: Left arm, Patient Position: Sitting, Cuff Size: large)   Pulse 87   Temp 97.4 °F (36.3 °C) (Temporal)   Resp 17   Wt 192 lb (87.1 kg)   SpO2 98%   BMI 32.96 kg/m²  Estimated body mass index is 32.96 kg/m² as calculated from the following:    Height as of 10/5/23: 5' 4\" (1.626 m).    Weight as of this encounter: 192 lb (87.1 kg).    Medicare Hearing Assessment  (Required for AWV/SWV)    Hearing Screening    Time taken: 2/15/2024 10:04 AM  Screening Method: Finger Rub  Finger Rub Result: Pass                   Visual Acuity  Right Eye Visual Acuity: Uncorrected Right Eye Chart Acuity: 20/40   Left Eye Visual Acuity: Uncorrected Left Eye Chart Acuity: 20/40   Both Eyes Visual Acuity: Uncorrected Both Eyes Chart Acuity: 20/30          General Appearance:  Alert, cooperative, no distress, appears stated age   Head:  Normocephalic, without obvious abnormality, atraumatic   Eyes:  PERRL, conjunctiva/corneas clear, EOM's intact both eyes   Ears:  Normal TM's and external ear canals, both ears   Nose: Nares normal, septum midline,mucosa normal, no drainage or sinus tenderness   Throat: Lips, mucosa, and tongue normal; teeth and gums normal   Neck: Supple, symmetrical, trachea midline, no adenopathy;  thyroid: not enlarged, symmetric, no tenderness/mass/nodules;    Back:   Symmetric, no curvature, ROM normal,    Lungs:   Clear to auscultation bilaterally, respirations unlabored   Heart:  Regular rate and rhythm, S1 and S2 normal, no murmur, rub, or gallop   Abdomen:   Soft, non-tender, bowel sounds active all four quadrants,  no masses, no organomegaly   Pelvic: Deferred   Extremities: Extremities normal, atraumatic, no cyanosis or edema   Pulses: 2+ and symmetric   Skin: Skin color, texture, turgor normal, no rashes or lesions   Lymph nodes:  Cervical, supraclavicular, and axillary nodes normal   Neurologic: Normal       Vaccination History     Immunization History   Administered Date(s) Administered    Covid-19 Vaccine Pfizer 30 mcg/0.3 ml 12/20/2020, 01/10/2021, 10/06/2021    Covid-19 Vaccine Pfizer Bivalent 30mcg/0.3mL 11/07/2022, 08/10/2023    Covid-19 Vaccine Pfizer Bony-Sucrose 30 mcg/0.3 ml 05/06/2022    FLU VAC High Dose 65 YRS & Older PRSV Free (33597) 11/07/2022, 09/01/2023    FLUAD High Dose 65 yr and older (63853) 10/06/2021    Flulaval, 3 Years & >, IM 10/06/2020    Influenza 10/03/2016, 10/09/2017, 10/09/2019    Pneumococcal (Prevnar 13) 10/03/2016    Pneumovax 23 10/30/2017    TDAP 02/17/2021    Zoster Vaccine Recombinant Adjuvanted (Shingrix) 01/14/2020, 03/13/2020        ASSESSMENT AND OTHER RELEVANT CHRONIC CONDITIONS:   Cydney Hart is a 74 year old female who presents for a Medicare Assessment.     PLAN SUMMARY:   Diagnoses and all orders for this visit:    1. Encounter for annual health examination  Completed today. Labs to quest.   - CBC WITH DIFFERENTIAL WITH PLATELET; Future  - COMP METABOLIC PANEL (14); Future  - LIPID PANEL; Future  - CBC WITH DIFFERENTIAL WITH PLATELET  - COMP METABOLIC PANEL (14)  - LIPID PANEL    2. Encounter for screening mammogram for malignant neoplasm of breast  Order placed.   - [facility] LUIS ANTONIO 2D+3D SCREENING BILAT (CPT=77067/73350); Future    3. Hyperlipidemia, unspecified hyperlipidemia type  On statin, check lipids.     4. Postoperative hypothyroidism  Following with ENT for thyroid US, check TSH.   - TSH W REFLEX TO FREE T4; Future  - TSH W REFLEX TO FREE T4    5. Palpitations  Stable, no new symptoms.     6. Class 1 obesity due to excess calories without serious comorbidity with body mass index (BMI) of 30.0 to 30.9 in adult  Doing well with weight watchers.     7. Hypokalemia  Resolved, but check yearly.     8. Family history of colon cancer  Due for colonoscopy this year. Referral placed for   Ora, who did her last one.         Diet assessment: good     PLAN:  The patient indicates understanding of these issues and agrees to the plan.  Reinforced healthy diet, lifestyle, and exercise.    Return for Wellness Visit.     Mohini Sanchez DO, 2/1/2021     General Health     In the past six months, have you lost more than 10 pounds without trying?: 2 - No  Has your appetite been poor?: No  How does the patient maintain a good energy level?: Daily Walks  How would you describe your daily physical activity?: Moderate  How would you describe your current health state?: Good  How do you maintain positive mental well-being?: Social Interaction      This section provided for quick review of chart, separate sheet to patient  PREVENTATIVE SERVICES  INDICATIONS AND SCHEDULE Internal Lab or Procedure External Lab or Procedure   Diabetes Screening      HbgA1C   Annually Lab Results   Component Value Date    A1C 4.6 01/06/2023         No data to display                Fasting Blood Sugar (FSB)Annually Glucose (mg/dL)   Date Value   10/05/2023 100 (H)     GLUCOSE (mg/dL)   Date Value   01/06/2023 90          Cardiovascular Disease Screening     LDL Annually LDL-CHOLESTEROL (mg/dL (calc))   Date Value   01/06/2023 83        EKG - w/ Initial Preventative Physical Exam only, or if medically necessary Electrocardiogram date12/28/2019       Colorectal Cancer Screening      Colonoscopy Screen every 10 years Health Maintenance   Topic Date Due    Colorectal Cancer Screening  04/18/2024    Update Health Maintenance if applicable    Flex Sigmoidoscopy Screen every 10 years No results found for this or any previous visit.      No data to display                 Fecal Occult Blood Annually No results found for: \"FOB\"      No data to display                Glaucoma Screening      Ophthalmology Visit Annually: Diabetics, FHx Glaucoma, AA>50, > 65      No data to display                Bone Density Screening      Dexascan Every  two years Last Dexa Scan:    XR DEXA BONE DENSITOMETRY (CPT=77080) 01/26/2019        No data to display                Pap and Pelvic      Pap: Every 3 yrs age 21-65 or Pap+HPV every 5 yrs age 30-65, age 65 and older at high risk No recommendations at this time Update Health Maintenance if applicable    Chlamydia  Annually if high risk No results found for: \"CHLAMYDIA\"      No data to display                Screening Mammogram      Mammogram Annually to 75, then as discussed Health Maintenance   Topic Date Due    Mammogram  02/24/2024    Update Health Maintenance if applicable     Immunizations (Update Immunization Activity if applicable)     Influenza  Covered Annually 9/1/2023 Please get every year    Pneumococcal 13 (Prevnar)  Covered Once after 65 10/03/2016 Please get once after your 65th birthday    Pneumococcal 23 (Pneumovax)  Covered Once after 65 10/30/2017 Please get once after your 65th birthday    Hepatitis B for Moderate/High Risk No vaccine history found Medium/high risk factors:   End-stage renal disease   Hemophiliacs who received Factor VIII or IX concentrates   Clients of institutions for the mentally retarded   Persons who live in the same house as a HepB virus carrier   Homosexual men   Illicit injectable drug abusers     Tetanus Toxoid  Only covered with a cut with metal- TD and TDaP Not covered by Medicare Part B No vaccine history found This may be covered with your prescription benefits, but Medicare does not cover unless Medically needed    Zoster  Not covered by Medicare Part B No vaccine history found This may be covered with your pharmacy  prescription benefits                        Template: JAMISON HOLM MEDICARE ANNUAL ASSESSMENT FEMALE [[id number]]

## 2024-02-15 NOTE — PATIENT INSTRUCTIONS
Cydney Hart's SCREENING SCHEDULE   Tests on this list are recommended by your physician but may not be covered, or covered at this frequency, by your insurer.   Please check with your insurance carrier before scheduling to verify coverage.   PREVENTATIVE SERVICES FREQUENCY &  COVERAGE DETAILS LAST COMPLETION DATE   Diabetes Screening    Fasting Blood Sugar /  Glucose    One screening every 12 months if never tested or if previously tested but not diagnosed with pre-diabetes   One screening every 6 months if diagnosed with pre-diabetes Lab Results   Component Value Date     (H) 10/05/2023        Cardiovascular Disease Screening    Lipid Panel  Cholesterol  Lipoprotein (HDL)  Triglycerides Covered every 5 years for all Medicare beneficiaries without apparent signs or symptoms of cardiovascular disease Lab Results   Component Value Date    CHOLEST 157 01/06/2023    HDL 58 01/06/2023    LDL 83 01/06/2023    TRIG 75 01/06/2023         Electrocardiogram (EKG)   Covered if needed at Welcome to Medicare, and non-screening if indicated for medical reasons 10/05/2023      Ultrasound Screening for Abdominal Aortic Aneurysm (AAA) Covered once in a lifetime for one of the following risk factors   • Men who are 65-75 years old and have ever smoked   • Anyone with a family history -     Colorectal Cancer Screening  Covered for ages 50-85; only need ONE of the following:    Colonoscopy   Covered every 10 years    Covered every 2 years if patient is at high risk or previous colonoscopy was abnormal 04/18/2019    Health Maintenance   Topic Date Due   • Colorectal Cancer Screening  04/18/2024       Flexible Sigmoidoscopy   Covered every 4 years -    Fecal Occult Blood Test Covered annually -   Bone Density Screening    Bone density screening    Covered every 2 years after age 65 if diagnosed with risk of osteoporosis or estrogen deficiency.    Covered yearly for long-term glucocorticoid medication use (Steroids) Last  Dexa Scan:    XR DEXA BONE DENSITOMETRY (CPT=77080) 01/26/2019      No recommendations at this time   Pap and Pelvic    Pap   Covered every 2 years for women at normal risk; Annually if at high risk 02/14/2023  No recommendations at this time    Chlamydia Annually if high risk -  No recommendations at this time   Screening Mammogram    Mammogram     Recommend annually for all female patients aged 40 and older    One baseline mammogram covered for patients aged 35-39 02/24/2023    Health Maintenance   Topic Date Due   • Mammogram  02/24/2024       Immunizations    Influenza Covered once per flu season  Please get every year 09/01/2023  No recommendations at this time    Pneumococcal Each vaccine (Ttbslfc30 & Gaxjmihsi08) covered once after 65 Prevnar 13: 10/03/2016    Zitnywlxf35: 10/30/2017     No recommendations at this time    Hepatitis B One screening covered for patients with certain risk factors   -  No recommendations at this time    Tetanus Toxoid Not covered by Medicare Part B unless medically necessary (cut with metal); may be covered with your pharmacy prescription benefits -    Tetanus, Diptheria and Pertusis TD and TDaP Not covered by Medicare Part B -  No recommendations at this time    Zoster Not covered by Medicare Part B; may be covered with your pharmacy  prescription benefits -  No recommendations at this time

## 2024-02-22 DIAGNOSIS — I10 ESSENTIAL HYPERTENSION: ICD-10-CM

## 2024-02-22 DIAGNOSIS — E89.0 POSTOPERATIVE HYPOTHYROIDISM: ICD-10-CM

## 2024-02-23 RX ORDER — AMLODIPINE BESYLATE 5 MG/1
5 TABLET ORAL DAILY
Qty: 90 TABLET | Refills: 3 | Status: SHIPPED | OUTPATIENT
Start: 2024-02-23

## 2024-02-23 RX ORDER — LEVOTHYROXINE SODIUM 88 UG/1
TABLET ORAL
Qty: 90 TABLET | Refills: 3 | Status: SHIPPED | OUTPATIENT
Start: 2024-02-23

## 2024-02-23 NOTE — TELEPHONE ENCOUNTER
Thyroid Medication Protocol Hhzknj6802/22/2024 10:02 AM   Protocol Details TSH in past 12 months    Last TSH value is normal    In person appointment or virtual visit in the past 12 mos or appointment in next 3 mos        Hypertension Medications Protocol Yrsylj6902/22/2024 10:02 AM   Protocol Details EGFRCR or GFRNAA > 50    CMP or BMP in past 12 months    Last BP reading less than 140/90    In person appointment or virtual visit in the past 12 mos or appointment in next 3 mos       LOV 2/15/24    Last Refill   Medication Quantity Refills Start End   ATORVASTATIN 10 MG Oral Tab 90 tablet 1 7/28/2023      Medication Quantity Refills Start End   levothyroxine 88 MCG Oral Tab 90 tablet 3 1/20/2023        Labs 1/6/23, pt does have orders for a TSH in system     Future Appointments   Date Time Provider Department Center   3/4/2024  9:20 AM CHANDNI CASTANEDA [facility] [place]

## 2024-02-24 LAB
ABSOLUTE BASOPHILS: 30 CELLS/UL (ref 0–200)
ABSOLUTE EOSINOPHILS: 93 CELLS/UL (ref 15–500)
ABSOLUTE LYMPHOCYTES: 1491 CELLS/UL (ref 850–3900)
ABSOLUTE MONOCYTES: 355 CELLS/UL (ref 200–950)
ABSOLUTE NEUTROPHILS: 1732 CELLS/UL (ref 1500–7800)
ALBUMIN/GLOBULIN RATIO: 1.9 (CALC) (ref 1–2.5)
ALBUMIN: 4.3 G/DL (ref 3.6–5.1)
ALKALINE PHOSPHATASE: 71 U/L (ref 37–153)
ALT: 15 U/L (ref 6–29)
AST: 20 U/L (ref 10–35)
BASOPHILS: 0.8 %
BILIRUBIN, TOTAL: 2.1 MG/DL (ref 0.2–1.2)
BUN/CREATININE RATIO: 18 (CALC) (ref 6–22)
BUN: 19 MG/DL (ref 7–25)
CALCIUM: 9.6 MG/DL (ref 8.6–10.4)
CARBON DIOXIDE: 31 MMOL/L (ref 20–32)
CHLORIDE: 104 MMOL/L (ref 98–110)
CHOL/HDLC RATIO: 2.6 (CALC)
CHOLESTEROL, TOTAL: 134 MG/DL
CREATININE: 1.07 MG/DL (ref 0.6–1)
EGFR: 55 ML/MIN/1.73M2
EOSINOPHILS: 2.5 %
GLOBULIN: 2.3 G/DL (CALC) (ref 1.9–3.7)
GLUCOSE: 94 MG/DL (ref 65–99)
HDL CHOLESTEROL: 52 MG/DL
HEMATOCRIT: 47.2 % (ref 35–45)
HEMOGLOBIN: 15.4 G/DL (ref 11.7–15.5)
LDL-CHOLESTEROL: 67 MG/DL (CALC)
LYMPHOCYTES: 40.3 %
MCH: 28.4 PG (ref 27–33)
MCHC: 32.6 G/DL (ref 32–36)
MCV: 86.9 FL (ref 80–100)
MONOCYTES: 9.6 %
MPV: 11.7 FL (ref 7.5–12.5)
NEUTROPHILS: 46.8 %
NON-HDL CHOLESTEROL: 82 MG/DL (CALC)
PLATELET COUNT: 167 THOUSAND/UL (ref 140–400)
POTASSIUM: 4.3 MMOL/L (ref 3.5–5.3)
PROTEIN, TOTAL: 6.6 G/DL (ref 6.1–8.1)
RDW: 12.9 % (ref 11–15)
RED BLOOD CELL COUNT: 5.43 MILLION/UL (ref 3.8–5.1)
SODIUM: 140 MMOL/L (ref 135–146)
TRIGLYCERIDES: 66 MG/DL
TSH W/REFLEX TO FT4: 0.82 MIU/L (ref 0.4–4.5)
WHITE BLOOD CELL COUNT: 3.7 THOUSAND/UL (ref 3.8–10.8)

## 2024-03-04 ENCOUNTER — HOSPITAL ENCOUNTER (OUTPATIENT)
Dept: MAMMOGRAPHY | Age: 74
Discharge: HOME | End: 2024-03-04
Attending: FAMILY MEDICINE
Payer: MEDICARE

## 2024-03-04 DIAGNOSIS — Z12.31 ENCOUNTER FOR SCREENING MAMMOGRAM FOR MALIGNANT NEOPLASM OF BREAST: ICD-10-CM

## 2024-03-04 PROCEDURE — 77063 BREAST TOMOSYNTHESIS BI: CPT | Performed by: FAMILY MEDICINE

## 2024-03-04 PROCEDURE — 77067 SCR MAMMO BI INCL CAD: CPT | Performed by: FAMILY MEDICINE

## 2024-06-09 DIAGNOSIS — E78.00 PURE HYPERCHOLESTEROLEMIA: ICD-10-CM

## 2024-06-10 RX ORDER — ATORVASTATIN CALCIUM 10 MG/1
10 TABLET, FILM COATED ORAL NIGHTLY
Qty: 90 TABLET | Refills: 1 | Status: SHIPPED | OUTPATIENT
Start: 2024-06-10

## 2024-06-10 NOTE — TELEPHONE ENCOUNTER
Sent per passed protocol:    Cholesterol Medication Protocol Mqtmvi1306/09/2024 05:31 PM   Protocol Details ALT < 80    ALT resulted within past year    Lipid panel within past 12 months    In person appointment or virtual visit in the past 12 mos or appointment in next 3 mos

## 2024-07-19 ENCOUNTER — TELEPHONE (OUTPATIENT)
Dept: FAMILY MEDICINE CLINIC | Facility: CLINIC | Age: 74
End: 2024-07-19

## 2024-07-19 NOTE — TELEPHONE ENCOUNTER
Colonoscopy report received and reviewed by Dr Sanchez  Done 7/19/24 by Dr Huan Payan gastro  Health maintenance updated  Sent to scan

## 2024-11-19 DIAGNOSIS — E78.00 PURE HYPERCHOLESTEROLEMIA: ICD-10-CM

## 2024-11-19 RX ORDER — ATORVASTATIN CALCIUM 10 MG/1
10 TABLET, FILM COATED ORAL NIGHTLY
Qty: 90 TABLET | Refills: 3 | Status: SHIPPED | OUTPATIENT
Start: 2024-11-19

## 2024-11-19 NOTE — TELEPHONE ENCOUNTER
Cholesterol Medication Protocol Lrtopb7311/19/2024 12:30 PM   Protocol Details ALT < 80    ALT resulted within past year    Lipid panel within past 12 months    In person appointment or virtual visit in the past 12 mos or appointment in next 3 mos

## 2025-01-07 DIAGNOSIS — I10 ESSENTIAL HYPERTENSION: ICD-10-CM

## 2025-01-07 RX ORDER — AMLODIPINE BESYLATE 5 MG/1
5 TABLET ORAL DAILY
Qty: 90 TABLET | Refills: 3 | Status: SHIPPED | OUTPATIENT
Start: 2025-01-07

## 2025-01-07 NOTE — TELEPHONE ENCOUNTER
Hypertension Medications Protocol Mtndlb4601/07/2025 01:28 PM   Protocol Details CMP or BMP in past 12 months    Last BP reading less than 140/90    In person appointment or virtual visit in the past 12 mos or appointment in next 3 mos    EGFRCR or GFRNAA > 50

## 2025-03-28 ENCOUNTER — OFFICE VISIT (OUTPATIENT)
Dept: FAMILY MEDICINE CLINIC | Facility: CLINIC | Age: 75
End: 2025-03-28
Payer: MEDICARE

## 2025-03-28 VITALS
OXYGEN SATURATION: 96 % | DIASTOLIC BLOOD PRESSURE: 74 MMHG | BODY MASS INDEX: 36 KG/M2 | RESPIRATION RATE: 18 BRPM | HEART RATE: 86 BPM | TEMPERATURE: 97 F | SYSTOLIC BLOOD PRESSURE: 130 MMHG | WEIGHT: 211.19 LBS

## 2025-03-28 DIAGNOSIS — E78.5 HYPERLIPIDEMIA, UNSPECIFIED HYPERLIPIDEMIA TYPE: ICD-10-CM

## 2025-03-28 DIAGNOSIS — Z00.00 ENCOUNTER FOR ANNUAL HEALTH EXAMINATION: Primary | ICD-10-CM

## 2025-03-28 DIAGNOSIS — E89.0 POSTOPERATIVE HYPOTHYROIDISM: ICD-10-CM

## 2025-03-28 DIAGNOSIS — R00.2 PALPITATIONS: ICD-10-CM

## 2025-03-28 DIAGNOSIS — Z80.0 FAMILY HISTORY OF COLON CANCER: ICD-10-CM

## 2025-03-28 DIAGNOSIS — E66.01 SEVERE OBESITY (BMI 35.0-39.9) WITH COMORBIDITY (HCC): Chronic | ICD-10-CM

## 2025-03-28 DIAGNOSIS — E87.6 HYPOKALEMIA: ICD-10-CM

## 2025-03-28 DIAGNOSIS — Z12.31 ENCOUNTER FOR SCREENING MAMMOGRAM FOR MALIGNANT NEOPLASM OF BREAST: ICD-10-CM

## 2025-03-28 NOTE — PROGRESS NOTES
HPI:   Cydney Hart is a 75 year old female who presents for a Medicare Subsequent Annual Wellness visit (Pt already had Initial Annual Wellness).    Spending time with grandkids. Son is getting . Involved with Worship.     Thyroid nodules and tinnitus: following with Dr. Dougherty. Stable. Some hearing loss.     Using her hands a lot makes her hands sore and swelling.      Doing weight watchers, regained some of the weight. Working on getting moving more.      Palpitations: doing well with amlodipine. BP wnl.   Occasional random episodes of lightheadedness. Never did cardiac testing.   Can feel it coming on, no pattern. She will just sit down. Once every few months, sometimes 3 x month.     CT heart score done in 2022 showed score of 22.  Incidentally showed dilated thoracic aorta 3.9 cm.      Following with derm, Cj Mccann in [place], had a few more spots removed, pre-cancerous. Nothing cancerous.      She has been screened for Falls and is low risk.    She had a completely normal cognitive assessment - see flowsheet entries    Cydney Hart has some abnormal functions as listed below:  She has Hearing problems based on screening of functional status.      Depression Screening (PHQ-2/PHQ-9): Over the LAST 2 WEEKS   Little interest or pleasure in doing things: Not at all  Feeling down, depressed, or hopeless: Not at all  PHQ-2 SCORE: 0      Advanced Directive:  She does NOT have a Living Will on file in [facility].   The patient has this document but we do not have it in [facility], and patient is instructed to get our office a copy of it for scanning into Epic.  Has this on file.      She does NOT have a Power of  for Health Care on file in [facility].   The patient has this document but we do not have it in [facility], and patient is instructed to get our office a copy of it for scanning into [facility]. son is designated as POA.        She has never smoked tobacco.  Ms. Hart does not currently take aspirin. We  discussed the risks and benefits of aspirin therapy.   Cydney Hart is unable to use daily aspirin therapy For the following reasons:   Other: discussed with pt, she will think about it.         CAGE Alcohol screening   Cydney Hart was screened for Alcohol abuse and had a score of 0 so is at low risk.    Patient Care Team: Patient Care Team:  Mohini Sanchez DO as PCP - General (Family Medicine)  Mg Carmona MD as Consulting Physician (NEUROLOGY)  Beth Patel APN as Obstetrician (Nurse Practitioner Women's Health)    Patient Active Problem List   Diagnosis    Postoperative hypothyroidism    Family history of colon cancer    Hypokalemia    Hyperlipidemia    Obesity, unspecified    Palpitations     Wt Readings from Last 3 Encounters:   03/28/25 211 lb 3.2 oz (95.8 kg)   02/15/24 192 lb (87.1 kg)   10/12/23 191 lb 2 oz (86.7 kg)      Last Cholesterol Labs:   Lab Results   Component Value Date    CHOLEST 134 02/23/2024    HDL 52 02/23/2024    LDL 67 02/23/2024    TRIG 66 02/23/2024          Last Chemistry Labs:   Lab Results   Component Value Date    AST 20 02/23/2024    ALT 15 02/23/2024    CA 9.6 02/23/2024    ALB 4.3 02/23/2024    TSH 1.60 02/09/2022    CREATSERUM 1.07 (H) 02/23/2024    GLU 94 02/23/2024        CBC  (most recent labs)   Lab Results   Component Value Date    WBC 3.7 (L) 02/23/2024    HGB 15.4 02/23/2024     02/23/2024        ALLERGIES:   She is allergic to morphine.    CURRENT MEDICATIONS:   Outpatient Medications Marked as Taking for the 3/28/25 encounter (Office Visit) with Mohini Sanchez DO   Medication Sig    AMLODIPINE 5 MG Oral Tab TAKE 1 TABLET EVERY DAY    ATORVASTATIN 10 MG Oral Tab TAKE 1 TABLET EVERY NIGHT    levothyroxine 88 MCG Oral Tab TAKE 1 TABLET EVERY DAY BEFORE BREAKFAST    Cholecalciferol (VITAMIN D) 1000 units Oral Tab Take 10,000 Int'l Units/day by mouth.      Cyanocobalamin (VITAMIN B-12 OR) Take by mouth.      MEDICAL INFORMATION:   She  has a  past medical history of Disorder of thyroid, Encephalitis (HCC) (1/7/2018), Migraines, and Problems with swallowing.    She  has a past surgical history that includes removal gallbladder; knee surgery; holly biopsy stereo nodule 1 site right (cpt=19081) (11/2016); cholecystectomy; and benign biopsy right.    Her family history includes Colon Cancer in her father; Depression in her paternal grandmother; Heart Attack in her father; Stroke in her mother; brain cancer (age of onset: 62) in her brother; gout in her brother, brother, and father; hardening of the arteries in her paternal grandmother; nonalc cirrhosis in her brother; thyroid cancer in her brother.   SOCIAL HISTORY:   She  reports that she has never smoked. She has never used smokeless tobacco. She reports that she does not drink alcohol and does not use drugs.     REVIEW OF SYSTEMS:   GENERAL: feels well otherwise  SKIN: denies any unusual skin lesions  EYES: denies blurred vision or double vision  HEENT: denies nasal congestion, sinus pain or ST  LUNGS: denies shortness of breath with exertion  CARDIOVASCULAR: denies chest pain on exertion  GI: denies abdominal pain, denies heartburn  : denies dysuria, vaginal discharge or itching, no complaint of urinary incontinence   MUSCULOSKELETAL: denies back pain or joint pain   NEURO: denies headaches  PSYCHE: denies depression or anxiety   HEMATOLOGIC: denies hx of anemia  ENDOCRINE: + thyroid history  ALL/ASTHMA: + hx of allergy well controlled     EXAM:   /74   Pulse 86   Temp 97.4 °F (36.3 °C) (Temporal)   Resp 18   Wt 211 lb 3.2 oz (95.8 kg)   SpO2 96%   BMI 36.25 kg/m²  Estimated body mass index is 36.25 kg/m² as calculated from the following:    Height as of 10/5/23: 5' 4\" (1.626 m).    Weight as of this encounter: 211 lb 3.2 oz (95.8 kg).    Medicare Hearing Assessment  (Required for AWV/SWV)    Hearing Screening    Time taken: 3/28/2025  9:46 AM  Screening Method: Finger Rub  Finger Rub Result:  Pass             Visual Acuity  Right Eye Visual Acuity: Uncorrected Right Eye Chart Acuity: 20/40   Left Eye Visual Acuity: Uncorrected Left Eye Chart Acuity: 20/40   Both Eyes Visual Acuity: Uncorrected Both Eyes Chart Acuity: 20/40          General Appearance:  Alert, cooperative, no distress, appears stated age   Head:  Normocephalic, without obvious abnormality, atraumatic   Eyes:  PERRL, conjunctiva/corneas clear, EOM's intact both eyes   Ears:  Normal TM's and external ear canals, both ears   Nose: Nares normal, septum midline,mucosa normal, no drainage or sinus tenderness   Throat: Lips, mucosa, and tongue normal; teeth and gums normal   Neck: Supple, symmetrical, trachea midline, no adenopathy;  thyroid: not enlarged, symmetric, no tenderness/mass/nodules;    Back:   Symmetric, no curvature, ROM normal,    Lungs:   Clear to auscultation bilaterally, respirations unlabored   Heart:  Regular rate and rhythm, S1 and S2 normal, no murmur, rub, or gallop   Abdomen:   Soft, non-tender, bowel sounds active all four quadrants,  no masses, no organomegaly   Pelvic: Deferred   Extremities: Extremities normal, atraumatic, no cyanosis or edema   Pulses: 2+ and symmetric   Skin: Skin color, texture, turgor normal, no rashes or lesions   Lymph nodes: Cervical, supraclavicular, and axillary nodes normal   Neurologic: Normal       Vaccination History     Immunization History   Administered Date(s) Administered    Covid-19 Vaccine Pfizer 30 mcg/0.3 ml 12/20/2020, 01/10/2021, 10/06/2021    Covid-19 Vaccine Pfizer Bivalent 30mcg/0.3mL 11/07/2022, 08/10/2023    Covid-19 Vaccine Pfizer Bony-Sucrose 30 mcg/0.3 ml 05/06/2022    FLU VAC High Dose 65 YRS & Older PRSV Free (31046) 11/07/2022, 09/01/2023    FLUAD High Dose 65 yr and older (55878) 10/06/2021    Flulaval, 3 Years & >, IM 10/06/2020    Influenza 10/03/2016, 10/09/2017, 10/09/2019    Pneumococcal (Prevnar 13) 10/03/2016    Pneumovax 23 10/30/2017    TDAP 02/17/2021     Zoster Vaccine Recombinant Adjuvanted (Shingrix) 01/14/2020, 03/13/2020        ASSESSMENT AND OTHER RELEVANT CHRONIC CONDITIONS:   Cydney Hart is a 75 year old female who presents for a Medicare Assessment.     PLAN SUMMARY:   Diagnoses and all orders for this visit:    1. Encounter for annual health examination  Completed today. Labs to quest.   - CBC With Differential With Platelet; Future  - Comp Metabolic Panel (14); Future  - Lipid Panel; Future  - TSH W Reflex To Free T4; Future  - CBC With Differential With Platelet  - Comp Metabolic Panel (14)  - Lipid Panel  - TSH W Reflex To Free T4    2. Encounter for screening mammogram for malignant neoplasm of breast  Char ordered.   - CHAR LUIS ANTONIO 2D+3D SCREENING BILAT (CPT=77067/68651); Future    3. Severe obesity (BMI 35.0-39.9) with comorbidity (HCC)  She is working on diet changes, weight watchers, increasing exercise.     4. Postoperative hypothyroidism  Check thyroid, adjust dosing if needed. She needs levothyoxine filled when lab is back.     5. Palpitations  Stable. No new symptoms.     6. Hypokalemia  Recheck levels.     7. Hyperlipidemia, unspecified hyperlipidemia type  On statin, doing well.     8. Family history of colon cancer  Up to date with colonoscopy, due again in 5 yrs.         Diet assessment: good     PLAN:  The patient indicates understanding of these issues and agrees to the plan.  Reinforced healthy diet, lifestyle, and exercise.    No follow-ups on file.     Mohini Sanchez DO, 2/1/2021     General Health     In the past six months, have you lost more than 10 pounds without trying?: (Patient-Rptd) 2 - No  Has your appetite been poor?: (Patient-Rptd) No  How does the patient maintain a good energy level?: (Patient-Rptd) Daily Walks  How would you describe your daily physical activity?: (Patient-Rptd) Moderate  How would you describe your current health state?: (Patient-Rptd) Good  How do you maintain positive mental well-being?: (Patient-Rptd)  Social Interaction, Puzzles, Visiting Friends, Visiting Family      This section provided for quick review of chart, separate sheet to patient  PREVENTATIVE SERVICES  INDICATIONS AND SCHEDULE Internal Lab or Procedure External Lab or Procedure   Diabetes Screening      HbgA1C   Annually Lab Results   Component Value Date    A1C 4.6 01/06/2023         No data to display                Fasting Blood Sugar (FSB)Annually GLUCOSE (mg/dL)   Date Value   02/23/2024 94          Cardiovascular Disease Screening     LDL Annually LDL-CHOLESTEROL (mg/dL (calc))   Date Value   02/23/2024 67        EKG - w/ Initial Preventative Physical Exam only, or if medically necessary Electrocardiogram date12/28/2019       Colorectal Cancer Screening      Colonoscopy Screen every 10 years Health Maintenance   Topic Date Due    Colorectal Cancer Screening  07/19/2029    Update Health Maintenance if applicable    Flex Sigmoidoscopy Screen every 10 years No results found for this or any previous visit.      No data to display                 Fecal Occult Blood Annually No results found for: \"FOB\"      No data to display                Glaucoma Screening      Ophthalmology Visit Annually: Diabetics, FHx Glaucoma, AA>50, > 65      No data to display                Bone Density Screening      Dexascan Every two years Last Dexa Scan:    XR DEXA BONE DENSITOMETRY (CPT=77080) 01/26/2019        No data to display                Pap and Pelvic      Pap: Every 3 yrs age 21-65 or Pap+HPV every 5 yrs age 30-65, age 65 and older at high risk No recommendations at this time Update Health Maintenance if applicable    Chlamydia  Annually if high risk No results found for: \"CHLAMYDIA\"      No data to display                Screening Mammogram      Mammogram Annually to 75, then as discussed Health Maintenance   Topic Date Due    Mammogram  03/04/2025    Update Health Maintenance if applicable     Immunizations (Update Immunization Activity if  applicable)     Influenza  Covered Annually 9/1/2023 Please get every year    Pneumococcal 13 (Prevnar)  Covered Once after 65 10/03/2016 Please get once after your 65th birthday    Pneumococcal 23 (Pneumovax)  Covered Once after 65 10/30/2017 Please get once after your 65th birthday    Hepatitis B for Moderate/High Risk No vaccine history found Medium/high risk factors:   End-stage renal disease   Hemophiliacs who received Factor VIII or IX concentrates   Clients of institutions for the mentally retarded   Persons who live in the same house as a HepB virus carrier   Homosexual men   Illicit injectable drug abusers     Tetanus Toxoid  Only covered with a cut with metal- TD and TDaP Not covered by Medicare Part B No vaccine history found This may be covered with your prescription benefits, but Medicare does not cover unless Medically needed    Zoster  Not covered by Medicare Part B No vaccine history found This may be covered with your pharmacy  prescription benefits                        Template: JAMISON HOLM MEDICARE ANNUAL ASSESSMENT FEMALE [62342]

## 2025-04-01 LAB
ABSOLUTE BASOPHILS: 21 CELLS/UL (ref 0–200)
ABSOLUTE EOSINOPHILS: 143 CELLS/UL (ref 15–500)
ABSOLUTE LYMPHOCYTES: 1533 CELLS/UL (ref 850–3900)
ABSOLUTE MONOCYTES: 353 CELLS/UL (ref 200–950)
ABSOLUTE NEUTROPHILS: 2150 CELLS/UL (ref 1500–7800)
ALBUMIN/GLOBULIN RATIO: 1.7 (CALC) (ref 1–2.5)
ALBUMIN: 4.4 G/DL (ref 3.6–5.1)
ALKALINE PHOSPHATASE: 89 U/L (ref 37–153)
ALT: 13 U/L (ref 6–29)
AST: 16 U/L (ref 10–35)
BASOPHILS: 0.5 %
BILIRUBIN, TOTAL: 1.9 MG/DL (ref 0.2–1.2)
BUN: 15 MG/DL (ref 7–25)
CALCIUM: 9.7 MG/DL (ref 8.6–10.4)
CARBON DIOXIDE: 29 MMOL/L (ref 20–32)
CHLORIDE: 103 MMOL/L (ref 98–110)
CHOL/HDLC RATIO: 2.9 (CALC)
CHOLESTEROL, TOTAL: 147 MG/DL
CREATININE: 0.99 MG/DL (ref 0.6–1)
EGFR: 60 ML/MIN/1.73M2
EOSINOPHILS: 3.4 %
GLOBULIN: 2.6 G/DL (CALC) (ref 1.9–3.7)
GLUCOSE: 112 MG/DL (ref 65–99)
HDL CHOLESTEROL: 51 MG/DL
HEMATOCRIT: 48.9 % (ref 35–45)
HEMOGLOBIN: 15.6 G/DL (ref 11.7–15.5)
LDL-CHOLESTEROL: 74 MG/DL (CALC)
LYMPHOCYTES: 36.5 %
MCH: 28.2 PG (ref 27–33)
MCHC: 31.9 G/DL (ref 32–36)
MCV: 88.3 FL (ref 80–100)
MONOCYTES: 8.4 %
MPV: 11.7 FL (ref 7.5–12.5)
NEUTROPHILS: 51.2 %
NON-HDL CHOLESTEROL: 96 MG/DL (CALC)
PLATELET COUNT: 212 THOUSAND/UL (ref 140–400)
POTASSIUM: 4.2 MMOL/L (ref 3.5–5.3)
PROTEIN, TOTAL: 7 G/DL (ref 6.1–8.1)
RDW: 12.7 % (ref 11–15)
RED BLOOD CELL COUNT: 5.54 MILLION/UL (ref 3.8–5.1)
SODIUM: 140 MMOL/L (ref 135–146)
TRIGLYCERIDES: 134 MG/DL
TSH W/REFLEX TO FT4: 1.18 MIU/L (ref 0.4–4.5)
WHITE BLOOD CELL COUNT: 4.2 THOUSAND/UL (ref 3.8–10.8)

## 2025-04-02 ENCOUNTER — HOSPITAL ENCOUNTER (OUTPATIENT)
Dept: MAMMOGRAPHY | Age: 75
Discharge: HOME | End: 2025-04-02
Attending: FAMILY MEDICINE
Payer: MEDICARE

## 2025-04-02 DIAGNOSIS — Z12.31 ENCOUNTER FOR SCREENING MAMMOGRAM FOR MALIGNANT NEOPLASM OF BREAST: ICD-10-CM

## 2025-04-02 PROCEDURE — 77063 BREAST TOMOSYNTHESIS BI: CPT | Performed by: FAMILY MEDICINE

## 2025-04-02 PROCEDURE — 77067 SCR MAMMO BI INCL CAD: CPT | Performed by: FAMILY MEDICINE

## 2025-04-15 DIAGNOSIS — E89.0 POSTOPERATIVE HYPOTHYROIDISM: ICD-10-CM

## 2025-04-15 RX ORDER — LEVOTHYROXINE SODIUM 88 UG/1
88 TABLET ORAL
Qty: 90 TABLET | Refills: 3 | Status: SHIPPED | OUTPATIENT
Start: 2025-04-15

## 2025-04-15 NOTE — TELEPHONE ENCOUNTER
Levothyroxine Sodium Oral Tablet 88 MCG   Thyroid Medication Protocol Ynxvud19/15/2025 11:47 AM   Protocol Details TSH in past 12 months    Last TSH value is normal    In person appointment or virtual visit in the past 12 mos or appointment in next 3 mos    Medication is active on med list   LOV /28/2025  Last labs 3/28/2025  Last refill on 2/23/2024, for #90, with 3 refills    No future appointments.

## 2025-06-12 ENCOUNTER — TELEPHONE (OUTPATIENT)
Dept: FAMILY MEDICINE CLINIC | Facility: CLINIC | Age: 75
End: 2025-06-12

## 2025-06-12 DIAGNOSIS — R00.2 PALPITATIONS: Primary | ICD-10-CM

## 2025-06-12 DIAGNOSIS — E89.0 POSTOPERATIVE HYPOTHYROIDISM: ICD-10-CM

## 2025-06-12 DIAGNOSIS — E78.5 HYPERLIPIDEMIA, UNSPECIFIED HYPERLIPIDEMIA TYPE: ICD-10-CM

## 2025-06-12 NOTE — TELEPHONE ENCOUNTER
Received paperwork from [facility]   Additional billing information needed for lab tests   TSH w/reflex  Triglycerides    Insufficient information to process bill

## 2025-07-22 ENCOUNTER — PATIENT MESSAGE (OUTPATIENT)
Dept: FAMILY MEDICINE CLINIC | Facility: CLINIC | Age: 75
End: 2025-07-22

## 2025-07-22 DIAGNOSIS — J01.90 ACUTE NON-RECURRENT SINUSITIS, UNSPECIFIED LOCATION: Primary | ICD-10-CM

## (undated) NOTE — LETTER
02/13/20        [place] [id number]      Dear Jed Squires,    [facility] records indicate that you have outstanding lab work and or testing that was ordered for you and has not yet been completed:  Orders Placed This Encount

## (undated) NOTE — LETTER
Date: 2023      Patient Name: Juan Ramon Waters      : 1950        Thank you for choosing [facility] as your health care provider. Your physician has deemed the following medical service(s) necessary. However, your insurance plan may not pay for all of your health care and costs and may deny payment for this service. The fact that your insurance plan does not pay for an item or service does not mean you should not receive it. The purpose of this form is to help you make an informed decision about whether or not you want to receive this service(s) that may not be paid for by your insurance plan. CPT Code Description     Cost     G0101_  Pelvic Breast Exam__________________________ $65.00_____________      N1959__Dru Swear______________________________ $48.23____________      _________ ______________________________ _____________      I understand that the above mentioned service(s) or supply may not be covered by my insurance company.  I agree to be financially responsible for the cost of this service or supply in the event of my insurance denies payment as a non-covered benefit.        ______________________________________________________________________  Signature of Patient or Patient's Representative  Relationship  Date    ______________________________________________________________________  Signature of Witness to signing of form   Printed Name

## (undated) NOTE — LETTER
Date: 2/6/2018    Patient Name: Karen Wyatt          To Whom it may concern: This letter has been written at the patient's request. The above patient was seen at the [facility] for treatment of a medical condition. Patient may return

## (undated) NOTE — LETTER
Date: 9/11/2020    Patient Name: Osmin Jarrell          To Whom it may concern: For this patient, 185 lbs is a healthy weight that she should try and maintain.         Sincerely,    Bear Medina DO

## (undated) NOTE — ED AVS SNAPSHOT
Alba Burrell   MRN: [id number]    Department:  [facility] Emergency Department in [address] [place] [address]   Date of Visit:  12/28/2019           Disclosure     Insurance plans vary and the physician(s) referred by the ER may not be covered by your plan.  Please co tell this physician (or your personal doctor if your instructions are to return to your personal doctor) about any new or lasting problems. The primary care or specialist physician will see patients referred from the [facility] Emergency Department.  Alley Lu

## (undated) NOTE — LETTER
[place] [id number]           Dear Mike Tom records indicate that you have outstanding lab work and or testing that was ordered for you and has not yet been completed:  Lab Frequency Next Occurrence   CARD MONITOR HOLTER 48 HOUR (CPT=93225) Once 11/17/2023   CARD ECHO 2D DOPPLER (CPT=93306) Once 11/17/2023      To provide you with the best possible care, please complete these orders at your earliest convenience. If you have recently completed these orders please disregard this letter. If you have any questions please call the office at [phone].      Thank you,     [facility]